# Patient Record
Sex: FEMALE | Race: WHITE | NOT HISPANIC OR LATINO | Employment: OTHER | URBAN - METROPOLITAN AREA
[De-identification: names, ages, dates, MRNs, and addresses within clinical notes are randomized per-mention and may not be internally consistent; named-entity substitution may affect disease eponyms.]

---

## 2017-01-31 ENCOUNTER — APPOINTMENT (OUTPATIENT)
Dept: GENERAL RADIOLOGY | Facility: HOSPITAL | Age: 62
End: 2017-01-31

## 2017-01-31 ENCOUNTER — HOSPITAL ENCOUNTER (EMERGENCY)
Facility: HOSPITAL | Age: 62
Discharge: HOME | End: 2017-01-31
Attending: EMERGENCY MEDICINE | Admitting: EMERGENCY MEDICINE

## 2017-01-31 VITALS
RESPIRATION RATE: 16 BRPM | OXYGEN SATURATION: 96 % | BODY MASS INDEX: 23.92 KG/M2 | WEIGHT: 130 LBS | HEART RATE: 96 BPM | DIASTOLIC BLOOD PRESSURE: 79 MMHG | TEMPERATURE: 98.1 F | SYSTOLIC BLOOD PRESSURE: 121 MMHG | HEIGHT: 62 IN

## 2017-01-31 DIAGNOSIS — E87.6 HYPOKALEMIA: ICD-10-CM

## 2017-01-31 DIAGNOSIS — I47.1 SVT (SUPRAVENTRICULAR TACHYCARDIA) (HCC): Primary | ICD-10-CM

## 2017-01-31 LAB
ALBUMIN SERPL-MCNC: 4.9 G/DL (ref 3.2–4.8)
ALBUMIN/GLOB SERPL: 1.4 G/DL (ref 1.5–2.5)
ALP SERPL-CCNC: 88 U/L (ref 25–100)
ALT SERPL W P-5'-P-CCNC: 18 U/L (ref 7–40)
ANION GAP SERPL CALCULATED.3IONS-SCNC: 11 MMOL/L (ref 3–11)
AST SERPL-CCNC: 29 U/L (ref 0–33)
BASOPHILS # BLD AUTO: 0.02 10*3/MM3 (ref 0–0.2)
BASOPHILS NFR BLD AUTO: 0.2 % (ref 0–1)
BILIRUB SERPL-MCNC: 0.5 MG/DL (ref 0.3–1.2)
BUN BLD-MCNC: 11 MG/DL (ref 9–23)
BUN/CREAT SERPL: 13.8 (ref 7–25)
CALCIUM SPEC-SCNC: 10.3 MG/DL (ref 8.7–10.4)
CHLORIDE SERPL-SCNC: 100 MMOL/L (ref 99–109)
CO2 SERPL-SCNC: 27 MMOL/L (ref 20–31)
CREAT BLD-MCNC: 0.8 MG/DL (ref 0.6–1.3)
DEPRECATED RDW RBC AUTO: 43.9 FL (ref 37–54)
EOSINOPHIL # BLD AUTO: 0.06 10*3/MM3 (ref 0.1–0.3)
EOSINOPHIL NFR BLD AUTO: 0.7 % (ref 0–3)
ERYTHROCYTE [DISTWIDTH] IN BLOOD BY AUTOMATED COUNT: 13.2 % (ref 11.3–14.5)
GFR SERPL CREATININE-BSD FRML MDRD: 73 ML/MIN/1.73
GLOBULIN UR ELPH-MCNC: 3.6 GM/DL
GLUCOSE BLD-MCNC: 146 MG/DL (ref 70–100)
HCT VFR BLD AUTO: 45 % (ref 34.5–44)
HGB BLD-MCNC: 15.2 G/DL (ref 11.5–15.5)
HOLD SPECIMEN: NORMAL
HOLD SPECIMEN: NORMAL
IMM GRANULOCYTES # BLD: 0.02 10*3/MM3 (ref 0–0.03)
IMM GRANULOCYTES NFR BLD: 0.2 % (ref 0–0.6)
INR PPP: 1.2 (ref 0.8–1.2)
LYMPHOCYTES # BLD AUTO: 3.41 10*3/MM3 (ref 0.6–4.8)
LYMPHOCYTES NFR BLD AUTO: 40.6 % (ref 24–44)
MAGNESIUM SERPL-MCNC: 2.2 MG/DL (ref 1.3–2.7)
MCH RBC QN AUTO: 31 PG (ref 27–31)
MCHC RBC AUTO-ENTMCNC: 33.8 G/DL (ref 32–36)
MCV RBC AUTO: 91.6 FL (ref 80–99)
MONOCYTES # BLD AUTO: 0.67 10*3/MM3 (ref 0–1)
MONOCYTES NFR BLD AUTO: 8 % (ref 0–12)
NEUTROPHILS # BLD AUTO: 4.22 10*3/MM3 (ref 1.5–8.3)
NEUTROPHILS NFR BLD AUTO: 50.3 % (ref 41–71)
PLATELET # BLD AUTO: 356 10*3/MM3 (ref 150–450)
PMV BLD AUTO: 9.8 FL (ref 6–12)
POTASSIUM BLD-SCNC: 3.3 MMOL/L (ref 3.5–5.5)
PROT SERPL-MCNC: 8.5 G/DL (ref 5.7–8.2)
PROTHROMBIN TIME: 13.8 SECONDS (ref 12.8–15.2)
RBC # BLD AUTO: 4.91 10*6/MM3 (ref 3.89–5.14)
SODIUM BLD-SCNC: 138 MMOL/L (ref 132–146)
T4 SERPL-MCNC: 9.1 MCG/DL (ref 4.7–11.4)
TROPONIN I SERPL-MCNC: <0.006 NG/ML
TSH SERPL DL<=0.05 MIU/L-ACNC: 2.01 MIU/ML (ref 0.35–5.35)
WBC NRBC COR # BLD: 8.4 10*3/MM3 (ref 3.5–10.8)
WHOLE BLOOD HOLD SPECIMEN: NORMAL
WHOLE BLOOD HOLD SPECIMEN: NORMAL

## 2017-01-31 PROCEDURE — 84443 ASSAY THYROID STIM HORMONE: CPT | Performed by: EMERGENCY MEDICINE

## 2017-01-31 PROCEDURE — 84436 ASSAY OF TOTAL THYROXINE: CPT | Performed by: EMERGENCY MEDICINE

## 2017-01-31 PROCEDURE — 83735 ASSAY OF MAGNESIUM: CPT | Performed by: EMERGENCY MEDICINE

## 2017-01-31 PROCEDURE — 99284 EMERGENCY DEPT VISIT MOD MDM: CPT

## 2017-01-31 PROCEDURE — 80053 COMPREHEN METABOLIC PANEL: CPT | Performed by: EMERGENCY MEDICINE

## 2017-01-31 PROCEDURE — 84484 ASSAY OF TROPONIN QUANT: CPT | Performed by: EMERGENCY MEDICINE

## 2017-01-31 PROCEDURE — 85610 PROTHROMBIN TIME: CPT

## 2017-01-31 PROCEDURE — 25010000002 ADENOSINE PER 6 MG: Performed by: EMERGENCY MEDICINE

## 2017-01-31 PROCEDURE — 96374 THER/PROPH/DIAG INJ IV PUSH: CPT

## 2017-01-31 PROCEDURE — 93005 ELECTROCARDIOGRAM TRACING: CPT | Performed by: EMERGENCY MEDICINE

## 2017-01-31 PROCEDURE — 85014 HEMATOCRIT: CPT

## 2017-01-31 PROCEDURE — 85025 COMPLETE CBC W/AUTO DIFF WBC: CPT | Performed by: EMERGENCY MEDICINE

## 2017-01-31 PROCEDURE — 80047 BASIC METABLC PNL IONIZED CA: CPT

## 2017-01-31 PROCEDURE — 71010 HC CHEST PA OR AP: CPT

## 2017-01-31 RX ORDER — DILTIAZEM HYDROCHLORIDE 180 MG/1
180 CAPSULE, COATED, EXTENDED RELEASE ORAL DAILY
Qty: 30 CAPSULE | Refills: 0 | Status: SHIPPED | OUTPATIENT
Start: 2017-01-31 | End: 2017-06-02 | Stop reason: HOSPADM

## 2017-01-31 RX ORDER — DILTIAZEM HCL/D5W 125 MG/125
5-15 PLASTIC BAG, INJECTION (ML) INTRAVENOUS
Status: DISCONTINUED | OUTPATIENT
Start: 2017-01-31 | End: 2017-01-31

## 2017-01-31 RX ORDER — SODIUM CHLORIDE 0.9 % (FLUSH) 0.9 %
10 SYRINGE (ML) INJECTION AS NEEDED
Status: DISCONTINUED | OUTPATIENT
Start: 2017-01-31 | End: 2017-01-31 | Stop reason: HOSPADM

## 2017-01-31 RX ORDER — ADENOSINE 3 MG/ML
6 INJECTION, SOLUTION INTRAVENOUS ONCE
Status: COMPLETED | OUTPATIENT
Start: 2017-01-31 | End: 2017-01-31

## 2017-01-31 RX ORDER — POTASSIUM CHLORIDE 750 MG/1
20 CAPSULE, EXTENDED RELEASE ORAL ONCE
Status: COMPLETED | OUTPATIENT
Start: 2017-01-31 | End: 2017-01-31

## 2017-01-31 RX ORDER — LEVOTHYROXINE SODIUM 0.05 MG/1
50 TABLET ORAL DAILY
COMMUNITY
End: 2018-09-20 | Stop reason: SDUPTHER

## 2017-01-31 RX ORDER — ADENOSINE 3 MG/ML
INJECTION, SOLUTION INTRAVENOUS
Status: DISCONTINUED
Start: 2017-01-31 | End: 2017-01-31 | Stop reason: HOSPADM

## 2017-01-31 RX ORDER — POTASSIUM CHLORIDE 750 MG/1
10 TABLET, FILM COATED, EXTENDED RELEASE ORAL DAILY
Qty: 4 TABLET | Refills: 0 | Status: SHIPPED | OUTPATIENT
Start: 2017-01-31 | End: 2017-03-29

## 2017-01-31 RX ADMIN — POTASSIUM CHLORIDE 20 MEQ: 750 CAPSULE, EXTENDED RELEASE ORAL at 16:45

## 2017-01-31 RX ADMIN — ADENOSINE 6 MG: 3 INJECTION, SOLUTION INTRAVENOUS at 14:25

## 2017-02-03 LAB
BUN BLDA-MCNC: 12 MG/DL (ref 8–26)
CA-I BLDA-SCNC: 1.11 MMOL/L (ref 1.2–1.32)
CHLORIDE BLDA-SCNC: 102 MMOL/L (ref 98–109)
CO2 BLDA-SCNC: 23 MMOL/L (ref 24–29)
CREAT BLDA-MCNC: 0.8 MG/DL (ref 0.6–1.3)
GLUCOSE BLDC GLUCOMTR-MCNC: 147 MG/DL (ref 70–130)
HCT VFR BLDA CALC: 46 % (ref 38–51)
HGB BLDA-MCNC: 15.6 G/DL (ref 12–17)
POTASSIUM BLDA-SCNC: 3.5 MMOL/L (ref 3.5–4.9)
SODIUM BLDA-SCNC: 141 MMOL/L (ref 138–146)

## 2017-02-09 ENCOUNTER — TRANSCRIBE ORDERS (OUTPATIENT)
Dept: LAB | Facility: HOSPITAL | Age: 62
End: 2017-02-09

## 2017-02-09 ENCOUNTER — LAB (OUTPATIENT)
Dept: LAB | Facility: HOSPITAL | Age: 62
End: 2017-02-09

## 2017-02-09 DIAGNOSIS — E87.6 HYPOKALEMIA: ICD-10-CM

## 2017-02-09 DIAGNOSIS — E87.6 HYPOKALEMIA: Primary | ICD-10-CM

## 2017-02-09 LAB
ANION GAP SERPL CALCULATED.3IONS-SCNC: 8 MMOL/L (ref 3–11)
BUN BLD-MCNC: 10 MG/DL (ref 9–23)
BUN/CREAT SERPL: 12.5 (ref 7–25)
CALCIUM SPEC-SCNC: 9.9 MG/DL (ref 8.7–10.4)
CHLORIDE SERPL-SCNC: 107 MMOL/L (ref 99–109)
CO2 SERPL-SCNC: 26 MMOL/L (ref 20–31)
CREAT BLD-MCNC: 0.8 MG/DL (ref 0.6–1.3)
GFR SERPL CREATININE-BSD FRML MDRD: 73 ML/MIN/1.73
GLUCOSE BLD-MCNC: 117 MG/DL (ref 70–100)
MAGNESIUM SERPL-MCNC: 2.1 MG/DL (ref 1.3–2.7)
POTASSIUM BLD-SCNC: 4.3 MMOL/L (ref 3.5–5.5)
SODIUM BLD-SCNC: 141 MMOL/L (ref 132–146)

## 2017-02-09 PROCEDURE — 36415 COLL VENOUS BLD VENIPUNCTURE: CPT | Performed by: INTERNAL MEDICINE

## 2017-02-09 PROCEDURE — 80048 BASIC METABOLIC PNL TOTAL CA: CPT | Performed by: INTERNAL MEDICINE

## 2017-02-09 PROCEDURE — 83735 ASSAY OF MAGNESIUM: CPT | Performed by: INTERNAL MEDICINE

## 2017-03-17 PROBLEM — M47.816 LUMBAR FACET ARTHROPATHY: Status: ACTIVE | Noted: 2017-03-17

## 2017-03-17 PROBLEM — M96.1 POSTLAMINECTOMY SYNDROME, LUMBAR: Status: ACTIVE | Noted: 2017-03-17

## 2017-03-17 PROBLEM — M43.17 SPONDYLOLISTHESIS OF LUMBOSACRAL REGION: Status: ACTIVE | Noted: 2017-03-17

## 2017-03-17 PROBLEM — M70.61 GREATER TROCHANTERIC BURSITIS OF RIGHT HIP: Status: ACTIVE | Noted: 2017-03-17

## 2017-03-17 PROBLEM — M79.18 MYOFACIAL MUSCLE PAIN: Status: ACTIVE | Noted: 2017-03-17

## 2017-03-17 PROBLEM — M53.3 SACROILIAC JOINT DYSFUNCTION OF RIGHT SIDE: Status: ACTIVE | Noted: 2017-03-17

## 2017-03-17 PROBLEM — M48.061 NEUROFORAMINAL STENOSIS OF LUMBAR SPINE: Status: ACTIVE | Noted: 2017-03-17

## 2017-03-17 PROBLEM — M21.70 UNEQUAL LEG LENGTH: Status: ACTIVE | Noted: 2017-03-17

## 2017-03-29 ENCOUNTER — CONSULT (OUTPATIENT)
Dept: CARDIOLOGY | Facility: CLINIC | Age: 62
End: 2017-03-29

## 2017-03-29 VITALS
DIASTOLIC BLOOD PRESSURE: 62 MMHG | HEIGHT: 64 IN | WEIGHT: 134.2 LBS | BODY MASS INDEX: 22.91 KG/M2 | SYSTOLIC BLOOD PRESSURE: 104 MMHG | HEART RATE: 62 BPM

## 2017-03-29 DIAGNOSIS — I47.1 SVT (SUPRAVENTRICULAR TACHYCARDIA) (HCC): Primary | ICD-10-CM

## 2017-03-29 PROBLEM — I47.10 SVT (SUPRAVENTRICULAR TACHYCARDIA): Status: ACTIVE | Noted: 2017-03-29

## 2017-03-29 PROCEDURE — 99244 OFF/OP CNSLTJ NEW/EST MOD 40: CPT | Performed by: INTERNAL MEDICINE

## 2017-03-29 PROCEDURE — 93000 ELECTROCARDIOGRAM COMPLETE: CPT | Performed by: INTERNAL MEDICINE

## 2017-03-29 RX ORDER — GUAIFENESIN 600 MG/1
1200 TABLET, EXTENDED RELEASE ORAL 2 TIMES DAILY
COMMUNITY
End: 2017-05-31 | Stop reason: DRUGHIGH

## 2017-03-29 NOTE — PROGRESS NOTES
Cardiology Consult     Zainab Kuhn  1955  [phone]  [phone]    03/29/17      [facility]    Mayra Stapleton MD  [address] [facility] [address]    Chief Complaint   Patient presents with   • Palpitations       1)SVT    A) Echo 3/8/2016: Normal LV wall motion and contractility. No valvular heart disease. There is an E to A reversal suggestive of diastolic dysfunction.    B) Documentation during 1/31/2017 ER visit - terminated with Adenosine.    2) Surgical History   Back surgery, parathyroid surgery, Tubal ligation, Sinus surgery.     History of Present Illness:   Ms. Kuhn is a pleasant 61-year-old  female who presented to the [facility] emergency department January 21, 2017 with complaints of tachypalpitations she was determined to be in supraventricular tachycardia.  She is an established patient of Dr. Mcdonald. Her first episode of SVT was March 2016. She was not started on any AVN agents. Then in November 2016, she started having more frequent episodes.  These episodes would last a few seconds. With this last episode, she tried vagal maneuvers and after an 1 hour, she went to the ER and she was given adenosine and converted to normal rhythm. Her symptoms are rapid tachy palpitations, SOB, presyncope, and chest discomfort. No syncope. Since then, she still has experienced some palpitations and feels like she is about to have an episode, but it goes away. When she is in NSR, she does not have SOB or palpitations. She states that she may have had a fast heart rate as a child. She has history of PVC's.  Laboratory evaluation the setting of that emergency department visit showed her potassium to be low at 3.3.  Magnesium  was normal at 2.2. Creatinine normal at 0.8. She also had a normal TSH. She has quit caffeine since January. She denies excessive ETOH, she drinks 4 oz of wine daily. No stimulants like Sudafed. No energy  drinks.     Allergies   Allergen Reactions   • Clobetasol Propionate    • Nasonex [Mometasone Furoate]    • Other      steroids   • Prednisone         Cannot display prior to admission medications because the patient has not been admitted in this contact.            Current Outpatient Prescriptions:   •  diltiaZEM CD (CARDIZEM CD) 180 MG 24 hr capsule, Take 1 capsule by mouth Daily., Disp: 30 capsule, Rfl: 0  •  guaiFENesin (MUCINEX) 600 MG 12 hr tablet, Take 1,200 mg by mouth 2 (Two) Times a Day., Disp: , Rfl:   •  levothyroxine (LEVOXYL) 50 MCG tablet, Take 50 mcg by mouth Daily., Disp: , Rfl:     Social History     Social History   • Marital status:      Spouse name: N/A   • Number of children: N/A   • Years of education: N/A     Social History Main Topics   • Smoking status: Never Smoker   • Smokeless tobacco: None   • Alcohol use No   • Drug use: No   • Sexual activity: Not Asked     Other Topics Concern   • None     Social History Narrative       Family History   Problem Relation Age of Onset   • Hypertension Mother    • Hypotension Father    • Prostate cancer Father    Father has CAD. No arrythmias.     REVIEW OF SYSTEMS:   CONST:  No weight loss, fever, chills, weakness or fatigue.   HEENT:  No visual loss, blurred vision, double vision, yellow sclerae.                   No hearing loss, congestion, sore throat.   SKIN:      No rashes, urticaria, ulcers, sores.     RESP:     Positive shortness of breath, No  hemoptysis, cough, sputum.   GI:           No anorexia, nausea, vomiting, diarrhea. No abdominal pain, melena.   :         No burning on urination, hematuria or increased frequency.  ENDO:    No diaphoresis, cold or heat intolerance. No polyuria or polydipsia.   NEURO:  No headache, dizziness, syncope, paralysis, ataxia, or parasthesias.                  No change in bowel or bladder control. No history of CVA/TIA  MUSC:    No muscle, back pain, joint pain or stiffness.   HEME:    No anemia,  "bleeding, bruising. No history of DVT/PE.  PSYCH:  No history of depression, anxiety    Vitals:    03/29/17 0910   BP: 104/62   BP Location: Right arm   Patient Position: Sitting   Pulse: 62   Weight: 134 lb 3.2 oz (60.9 kg)   Height: 64\" (162.6 cm)       Physical Exam:  GEN: Well nourished, Well- developed  No acute distress  HEENT: Normocephalic, Atraumatic, PERRLA, moist mucous membranes  NECK: supple, NO JVD, no thyromegaly, no lymphadenopathy  CARD: S1S2  RRR no murmur, gallop, rub  LUNGS: Clear to auscultataion, normal respiratory effort  ABDOMEN: Soft, nontender, normal bowel sounds  EXTREMITIES:No gross deformities,  No clubbing, cyanosis, or edema  SKIN: Warm, dry, intact, no rashes  NEURO: No focal deficits  PSYCHIATRIC: Normal affect and mood, A and O x 3      Data:                                      ECG 12 Lead  Date/Time: 3/29/2017 9:15 AM  Performed by: SARAH OLIVER  Authorized by: SARAH OLIVER   Rhythm: sinus rhythm  BPM: 62                  Assessment and Plan:   1. SVT (supraventricular tachycardia)    Dr. Oliver had a long discussion with the patient about the nature of her arrhythmia and options for treating it. 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 The risks, benefits, and alternatives of the procedure have been reviewed and the patient wishes to proceed. She would need to stop her Diltiazem 5 days before the procedure.    Scribed for Sarah Oliver MD by Fabby Vega PA-C. 3/29/2017  9:48 AM    ISarah MD, personally performed the services face to face as described in this documentation and as scribed by the above named individual in my presence, and it is both accurate and complete.  3/29/2017  9:52 AM                     "

## 2017-04-20 ENCOUNTER — TRANSCRIBE ORDERS (OUTPATIENT)
Dept: ADMINISTRATIVE | Facility: HOSPITAL | Age: 62
End: 2017-04-20

## 2017-04-20 DIAGNOSIS — Z12.31 VISIT FOR SCREENING MAMMOGRAM: Primary | ICD-10-CM

## 2017-05-04 ENCOUNTER — HOSPITAL ENCOUNTER (OUTPATIENT)
Dept: MAMMOGRAPHY | Facility: HOSPITAL | Age: 62
Discharge: HOME | End: 2017-05-04
Admitting: INTERNAL MEDICINE

## 2017-05-04 DIAGNOSIS — Z12.31 VISIT FOR SCREENING MAMMOGRAM: ICD-10-CM

## 2017-05-04 PROCEDURE — 77063 BREAST TOMOSYNTHESIS BI: CPT | Performed by: RADIOLOGY

## 2017-05-04 PROCEDURE — G0202 SCR MAMMO BI INCL CAD: HCPCS

## 2017-05-04 PROCEDURE — 77063 BREAST TOMOSYNTHESIS BI: CPT

## 2017-05-04 PROCEDURE — 77067 SCR MAMMO BI INCL CAD: CPT | Performed by: RADIOLOGY

## 2017-05-05 ENCOUNTER — PREP FOR SURGERY (OUTPATIENT)
Dept: CARDIOLOGY | Facility: CLINIC | Age: 62
End: 2017-05-05

## 2017-05-05 DIAGNOSIS — I47.1 SVT (SUPRAVENTRICULAR TACHYCARDIA) (HCC): Primary | ICD-10-CM

## 2017-05-05 RX ORDER — PROMETHAZINE HYDROCHLORIDE 25 MG/ML
12.5 INJECTION, SOLUTION INTRAMUSCULAR; INTRAVENOUS EVERY 4 HOURS PRN
Status: CANCELLED | OUTPATIENT
Start: 2017-05-05

## 2017-05-05 RX ORDER — ACETAMINOPHEN 325 MG/1
650 TABLET ORAL EVERY 4 HOURS PRN
Status: CANCELLED | OUTPATIENT
Start: 2017-05-05

## 2017-05-05 RX ORDER — NITROGLYCERIN 0.4 MG/1
0.4 TABLET SUBLINGUAL
Status: CANCELLED | OUTPATIENT
Start: 2017-05-05

## 2017-05-31 ENCOUNTER — APPOINTMENT (OUTPATIENT)
Dept: PREADMISSION TESTING | Facility: HOSPITAL | Age: 62
End: 2017-05-31

## 2017-05-31 DIAGNOSIS — I47.1 SVT (SUPRAVENTRICULAR TACHYCARDIA) (HCC): ICD-10-CM

## 2017-05-31 LAB
ANION GAP SERPL CALCULATED.3IONS-SCNC: 4 MMOL/L (ref 3–11)
BUN BLD-MCNC: 10 MG/DL (ref 9–23)
BUN/CREAT SERPL: 14.3 (ref 7–25)
CALCIUM SPEC-SCNC: 9.9 MG/DL (ref 8.7–10.4)
CHLORIDE SERPL-SCNC: 105 MMOL/L (ref 99–109)
CO2 SERPL-SCNC: 32 MMOL/L (ref 20–31)
CREAT BLD-MCNC: 0.7 MG/DL (ref 0.6–1.3)
DEPRECATED RDW RBC AUTO: 45.1 FL (ref 37–54)
ERYTHROCYTE [DISTWIDTH] IN BLOOD BY AUTOMATED COUNT: 13.1 % (ref 11.3–14.5)
GFR SERPL CREATININE-BSD FRML MDRD: 85 ML/MIN/1.73
GLUCOSE BLD-MCNC: 78 MG/DL (ref 70–100)
HBA1C MFR BLD: 5.1 % (ref 4.8–5.6)
HCT VFR BLD AUTO: 43.2 % (ref 34.5–44)
HGB BLD-MCNC: 14.2 G/DL (ref 11.5–15.5)
INR PPP: 0.92
MCH RBC QN AUTO: 30.7 PG (ref 27–31)
MCHC RBC AUTO-ENTMCNC: 32.9 G/DL (ref 32–36)
MCV RBC AUTO: 93.5 FL (ref 80–99)
PLATELET # BLD AUTO: 279 10*3/MM3 (ref 150–450)
PMV BLD AUTO: 9.3 FL (ref 6–12)
POTASSIUM BLD-SCNC: 4.4 MMOL/L (ref 3.5–5.5)
PROTHROMBIN TIME: 10 SECONDS (ref 9.6–11.5)
RBC # BLD AUTO: 4.62 10*6/MM3 (ref 3.89–5.14)
SODIUM BLD-SCNC: 141 MMOL/L (ref 132–146)
WBC NRBC COR # BLD: 4.8 10*3/MM3 (ref 3.5–10.8)

## 2017-05-31 RX ORDER — LEVOTHYROXINE SODIUM 0.05 MG/1
50 TABLET ORAL DAILY
COMMUNITY
Start: 2015-06-18 | End: 2017-05-31 | Stop reason: SDUPTHER

## 2017-05-31 RX ORDER — IBUPROFEN 200 MG
400 TABLET ORAL EVERY 6 HOURS PRN
COMMUNITY
Start: 2015-06-18 | End: 2022-04-20

## 2017-05-31 RX ORDER — TIZANIDINE 2 MG/1
1 TABLET ORAL 3 TIMES DAILY PRN
COMMUNITY
Start: 2013-08-29 | End: 2020-01-08 | Stop reason: SDUPTHER

## 2017-06-01 ENCOUNTER — HOSPITAL ENCOUNTER (OUTPATIENT)
Facility: HOSPITAL | Age: 62
Setting detail: OBSERVATION
Discharge: HOME | End: 2017-06-02
Attending: INTERNAL MEDICINE | Admitting: INTERNAL MEDICINE

## 2017-06-01 DIAGNOSIS — I47.1 SVT (SUPRAVENTRICULAR TACHYCARDIA) (HCC): ICD-10-CM

## 2017-06-01 PROBLEM — I49.3 PVC (PREMATURE VENTRICULAR CONTRACTION): Status: ACTIVE | Noted: 2017-06-01

## 2017-06-01 PROCEDURE — 93613 INTRACARDIAC EPHYS 3D MAPG: CPT | Performed by: INTERNAL MEDICINE

## 2017-06-01 PROCEDURE — C1732 CATH, EP, DIAG/ABL, 3D/VECT: HCPCS | Performed by: INTERNAL MEDICINE

## 2017-06-01 PROCEDURE — G0378 HOSPITAL OBSERVATION PER HR: HCPCS

## 2017-06-01 PROCEDURE — 25010000002 ONDANSETRON PER 1 MG: Performed by: INTERNAL MEDICINE

## 2017-06-01 PROCEDURE — 93653 COMPRE EP EVAL TX SVT: CPT | Performed by: INTERNAL MEDICINE

## 2017-06-01 PROCEDURE — 25010000002 FENTANYL CITRATE (PF) 100 MCG/2ML SOLUTION: Performed by: INTERNAL MEDICINE

## 2017-06-01 PROCEDURE — 25010000002 MIDAZOLAM PER 1 MG: Performed by: INTERNAL MEDICINE

## 2017-06-01 PROCEDURE — 93623 PRGRMD STIMJ&PACG IV RX NFS: CPT | Performed by: INTERNAL MEDICINE

## 2017-06-01 PROCEDURE — C1730 CATH, EP, 19 OR FEW ELECT: HCPCS | Performed by: INTERNAL MEDICINE

## 2017-06-01 PROCEDURE — C1894 INTRO/SHEATH, NON-LASER: HCPCS | Performed by: INTERNAL MEDICINE

## 2017-06-01 PROCEDURE — 25010000002 HEPARIN (PORCINE) PER 1000 UNITS: Performed by: INTERNAL MEDICINE

## 2017-06-01 RX ORDER — MIDAZOLAM HYDROCHLORIDE 1 MG/ML
INJECTION INTRAMUSCULAR; INTRAVENOUS AS NEEDED
Status: DISCONTINUED | OUTPATIENT
Start: 2017-06-01 | End: 2017-06-01 | Stop reason: HOSPADM

## 2017-06-01 RX ORDER — FENTANYL CITRATE 50 UG/ML
INJECTION, SOLUTION INTRAMUSCULAR; INTRAVENOUS AS NEEDED
Status: DISCONTINUED | OUTPATIENT
Start: 2017-06-01 | End: 2017-06-01 | Stop reason: HOSPADM

## 2017-06-01 RX ORDER — LIDOCAINE HYDROCHLORIDE 10 MG/ML
INJECTION, SOLUTION INFILTRATION; PERINEURAL AS NEEDED
Status: DISCONTINUED | OUTPATIENT
Start: 2017-06-01 | End: 2017-06-01 | Stop reason: HOSPADM

## 2017-06-01 RX ORDER — SODIUM CHLORIDE 9 MG/ML
INJECTION, SOLUTION INTRAVENOUS CONTINUOUS PRN
Status: DISCONTINUED | OUTPATIENT
Start: 2017-06-01 | End: 2017-06-01 | Stop reason: HOSPADM

## 2017-06-01 RX ORDER — PROMETHAZINE HYDROCHLORIDE 25 MG/ML
12.5 INJECTION, SOLUTION INTRAMUSCULAR; INTRAVENOUS EVERY 4 HOURS PRN
Status: DISCONTINUED | OUTPATIENT
Start: 2017-06-01 | End: 2017-06-01 | Stop reason: HOSPADM

## 2017-06-01 RX ORDER — HYDROCODONE BITARTRATE AND ACETAMINOPHEN 5; 325 MG/1; MG/1
1 TABLET ORAL EVERY 4 HOURS PRN
Status: DISCONTINUED | OUTPATIENT
Start: 2017-06-01 | End: 2017-06-02 | Stop reason: HOSPADM

## 2017-06-01 RX ORDER — TIZANIDINE 4 MG/1
2 TABLET ORAL 3 TIMES DAILY PRN
Status: DISCONTINUED | OUTPATIENT
Start: 2017-06-01 | End: 2017-06-02 | Stop reason: HOSPADM

## 2017-06-01 RX ORDER — ACETAMINOPHEN 325 MG/1
650 TABLET ORAL EVERY 4 HOURS PRN
Status: DISCONTINUED | OUTPATIENT
Start: 2017-06-01 | End: 2017-06-01 | Stop reason: HOSPADM

## 2017-06-01 RX ORDER — NITROGLYCERIN 0.4 MG/1
0.4 TABLET SUBLINGUAL
Status: DISCONTINUED | OUTPATIENT
Start: 2017-06-01 | End: 2017-06-01 | Stop reason: HOSPADM

## 2017-06-01 RX ORDER — LEVOTHYROXINE SODIUM 0.05 MG/1
50 TABLET ORAL
Status: DISCONTINUED | OUTPATIENT
Start: 2017-06-02 | End: 2017-06-02 | Stop reason: HOSPADM

## 2017-06-01 RX ORDER — ONDANSETRON 2 MG/ML
4 INJECTION INTRAMUSCULAR; INTRAVENOUS EVERY 6 HOURS PRN
Status: DISCONTINUED | OUTPATIENT
Start: 2017-06-01 | End: 2017-06-02 | Stop reason: HOSPADM

## 2017-06-01 RX ORDER — ONDANSETRON 2 MG/ML
INJECTION INTRAMUSCULAR; INTRAVENOUS AS NEEDED
Status: DISCONTINUED | OUTPATIENT
Start: 2017-06-01 | End: 2017-06-01 | Stop reason: HOSPADM

## 2017-06-01 RX ORDER — ACETAMINOPHEN 325 MG/1
650 TABLET ORAL EVERY 4 HOURS PRN
Status: DISCONTINUED | OUTPATIENT
Start: 2017-06-01 | End: 2017-06-02 | Stop reason: HOSPADM

## 2017-06-01 NOTE — PLAN OF CARE
Problem: Patient Care Overview (Adult)  Goal: Plan of Care Review  Outcome: Ongoing (interventions implemented as appropriate)    06/01/17 0840   Coping/Psychosocial Response Interventions   Plan Of Care Reviewed With patient   Patient Care Overview   Progress no change   Outcome Evaluation   Outcome Summary/Follow up Plan PT TO HAVE EP STUDY WITH DR. LI TODAY.         Problem: Arrhythmia/Dysrhythmia (Symptomatic) (Adult)  Goal: Signs and Symptoms of Listed Potential Problems Will be Absent or Manageable (Arrhythmia/Dysrhythmia)    06/01/17 0840   Arrhythmia/Dysrhythmia (Symptomatic)   Problems Assessed (Arrhythmia/Dysrhythmia) all   Problems Present (Arrhythmia/Dysrhythmia) none

## 2017-06-01 NOTE — H&P
HISTORY AND PHYSICAL                                          CC:SVT    Patient Care Team:  Mayra Stapleton MD as PCP - General  Mayra Stapleton MD as PCP - Family Medicine   Referring Provider:Dr. Aaron Foster    PROBLEM LIST:  1)SVT    A) Echo 3/8/2016: Normal LV wall motion and contractility. No valvular heart disease. There is an E to A reversal suggestive of diastolic dysfunction.    B) Documentation during 1/31/2017 ER visit - terminated with Adenosine.    2) Surgical History   Back surgery, parathyroid surgery, Tubal ligation, Sinus surgery.    Patient Active Problem List   Diagnosis   • Postlaminectomy syndrome, lumbar   • Spondylolisthesis of lumbosacral region   • Unequal leg length   • Sacroiliac joint dysfunction of right side   • Greater trochanteric bursitis of right hip   • Myofacial muscle pain   • Lumbar facet arthropathy   • Neuroforaminal stenosis of lumbar spine   • SVT (supraventricular tachycardia)   • PVC (premature ventricular contraction)       Active Ambulatory Problems     Diagnosis Date Noted   • Postlaminectomy syndrome, lumbar 03/17/2017   • Spondylolisthesis of lumbosacral region 03/17/2017   • Unequal leg length 03/17/2017   • Sacroiliac joint dysfunction of right side 03/17/2017   • Greater trochanteric bursitis of right hip 03/17/2017   • Myofacial muscle pain 03/17/2017   • Lumbar facet arthropathy 03/17/2017   • Neuroforaminal stenosis of lumbar spine 03/17/2017   • SVT (supraventricular tachycardia) 03/29/2017     Resolved Ambulatory Problems     Diagnosis Date Noted   • No Resolved Ambulatory Problems     Past Medical History:   Diagnosis Date   • Back pain    • Constipation    • Disease of thyroid gland    • Limb pain    • Muscle spasm    • Pain, upper back    • PONV (postoperative nausea and vomiting)    • Skin cancer    • SVT (supraventricular tachycardia)    • Wears glasses   "      Prescriptions Prior to Admission   Medication Sig Dispense Refill Last Dose   • Fexofenadine HCl (MUCINEX ALLERGY PO) Take 1,200 mg by mouth Daily.   5/31/2017 at Unknown time   • ibuprofen (ADVIL) 200 MG tablet Take 400 mg by mouth Every 6 (Six) Hours As Needed.   Past Week at Unknown time   • levothyroxine (LEVOXYL) 50 MCG tablet Take 50 mcg by mouth Daily.   6/1/2017 at Unknown time   • Multiple Vitamins-Minerals (MULTIVITAMIN ADULT PO) Take 1 tablet by mouth Daily.   5/31/2017 at Unknown time   • tiZANidine (ZANAFLEX) 2 MG tablet Take 1 tablet by mouth 3 (Three) Times a Day As Needed.   Past Month at Unknown time   • diltiaZEM CD (CARDIZEM CD) 180 MG 24 hr capsule Take 1 capsule by mouth Daily. (Patient taking differently: Take 180 mg by mouth Daily. INSTRUCTED TO STOP PER DR LI ORDERS PER PT REPORT) 30 capsule 0 5/26/2017       Subjective .     History of present illness:   The patient is a pleasant 62-year-old white female who is admitted to [facility] today for EP study and RFA of adenosine sensitive SVT.  Patient reports that she has had multiple episodes of tachypalpitations some dating back to when she was in adolescence.  Recently these have occurred more often and are lasting longer duration.  She was admitted to [facility] the emergency room on 01/21/2017 with tachypalpitations and EKG revealed an SVT.  She received IV adenosine which abated the arrhythmia.  She initially was evaluated by Dr. Aaron Foster prior to this in early November and another episode previous to that in March 2016.  She does have occasional \"skipped beats\" and palpitations with a known history of PVCs.  She denies any chest tightness or chest pain suggesting as pectoris.  She denies any near-syncope or significant events.  She had normal TSH level when she was in the ER in January.  She however, she did have low potassium of 3.3 on this most recent ER visit.  She eliminate caffeine from her " "diet and denies excessive alcohol use.      Social History     Social History   • Marital status:      Spouse name: N/A   • Number of children: N/A   • Years of education: N/A     Occupational History   • Not on file.     Social History Main Topics   • Smoking status: Never Smoker   • Smokeless tobacco: Never Used   • Alcohol use 4.2 oz/week     7 Glasses of wine per week      Comment: 4OZ DAILY    • Drug use: No   • Sexual activity: Defer     Other Topics Concern   • Not on file     Social History Narrative     Family History   Problem Relation Age of Onset   • Hypertension Mother    • Hypotension Father    • Prostate cancer Father    • Breast cancer Neg Hx    • Endometrial cancer Neg Hx    • Ovarian cancer Neg Hx      Past Surgical History:   Procedure Laterality Date   • BACK SURGERY  2009   • COLONOSCOPY  2012   • LUMBAR DISC SURGERY  2009    Status post left L3-L4 discectomy. Minor recurrent disc bulge at L3-L4. Possible discogenic pain component; PER DR MACK    • PARATHYROIDECTOMY  01/2015    Parathyroid Complete Parathyroidectomy   • SINUS SURGERY     • SKIN CANCER EXCISION Right     MELANOMA REMOVED FROM RIGHT THIGH    • THYROID SURGERY  01/2015   • TUBAL ABDOMINAL LIGATION         Review of Systems:   Pertinent items are noted in HPI, all other systems reviewed and negative    Objective     Vitals:  Blood pressure 98/64, pulse 73, temperature 98.1 °F (36.7 °C), temperature source Temporal Artery , resp. rate 18, height 64\" (162.6 cm), weight 130 lb 1.1 oz (59 kg), SpO2 99 %.   No intake or output data in the 24 hours ending 06/01/17 0920       Physical Exam:     General Appearance:    Alert, cooperative, in no acute distress   Head:    Normocephalic, without obvious abnormality, atraumatic   Eyes:            Lids and lashes normal, conjunctivae and sclerae normal, no   icterus, no pallor, corneas clear, PERRLA   Ears:    Ears appear intact with no abnormalities noted   Throat:   No oral lesions, no " thrush, oral mucosa moist   Neck:   No adenopathy, supple, trachea midline, no thyromegaly, no     carotid bruit, no JVD   Back:     No kyphosis present, no scoliosis present, no skin lesions,       erythema or scars, no tenderness to percussion or                   palpation,   range of motion normal   Lungs:     Clear to auscultation,respirations regular, even and                   unlabored    Heart:    Regular rhythm and normal rate, normal S1 and S2, no            murmur, no gallop, no rub, no click   Breast Exam:    Deferred   Abdomen:     Normal bowel sounds, no masses, no organomegaly, soft        non-tender, non-distended, no guarding, no rebound                 tenderness   Genitalia:    Deferred   Extremities:   Moves all extremities well, no edema, no cyanosis, no              redness   Pulses:   Pulses palpable and equal bilaterally   Skin:   No bleeding, bruising or rash   Lymph nodes:   No palpable adenopathy   Neurologic:   Cranial nerves 2 - 12 grossly intact, sensation intact, DTR        present and equal bilaterally        Results Review:     I reviewed the patient's new clinical results.      Results from last 7 days  Lab Units 05/31/17  0952   WBC 10*3/mm3 4.80   HEMOGLOBIN g/dL 14.2   HEMATOCRIT % 43.2   PLATELETS 10*3/mm3 279       Results from last 7 days  Lab Units 05/31/17  0952   SODIUM mmol/L 141   POTASSIUM mmol/L 4.4   CHLORIDE mmol/L 105   TOTAL CO2 mmol/L 32.0*   BUN mg/dL 10   CREATININE mg/dL 0.70   CALCIUM mg/dL 9.9   GLUCOSE mg/dL 78       Results from last 7 days  Lab Units 05/31/17  0952   SODIUM mmol/L 141   POTASSIUM mmol/L 4.4   CHLORIDE mmol/L 105   TOTAL CO2 mmol/L 32.0*   BUN mg/dL 10   CREATININE mg/dL 0.70   GLUCOSE mg/dL 78   CALCIUM mg/dL 9.9       Results from last 7 days  Lab Units 05/31/17  0952   INR  0.92       Tele:  Sinus rhythm     ASSESSMENT:  Hospital Problem List     * (Principal)SVT (supraventricular tachycardia)    Overview Addendum 6/1/2017  9:20 AM by  WILTON Markham     ·  Echo 3/8/2016: Normal LV wall motion and contractility. No valvular heart disease. There is an E to A reversal suggestive of diastolic dysfunction.    Documentation during 1/31/2017 ER visit - terminated with Adenosine  · CCB Added 1/17           PVC (premature ventricular contraction)        PLAN:  EPS and RFA of SVT.  Risk and Benefits explained in detail and patient wants to proceed.     Scribe for WILTON Husain  06/01/17   9:20 AM      IJeremy MD, personally performed the services face to face as described and documented by the above named individual. I have made any necessary edits and it is both accurate and complete 6/1/2017  10:44 AM

## 2017-06-02 VITALS
OXYGEN SATURATION: 98 % | RESPIRATION RATE: 10 BRPM | BODY MASS INDEX: 22.21 KG/M2 | SYSTOLIC BLOOD PRESSURE: 119 MMHG | DIASTOLIC BLOOD PRESSURE: 69 MMHG | WEIGHT: 130.07 LBS | HEIGHT: 64 IN | HEART RATE: 68 BPM | TEMPERATURE: 99.4 F

## 2017-06-02 PROCEDURE — 93010 ELECTROCARDIOGRAM REPORT: CPT | Performed by: INTERNAL MEDICINE

## 2017-06-02 PROCEDURE — 93005 ELECTROCARDIOGRAM TRACING: CPT | Performed by: INTERNAL MEDICINE

## 2017-06-02 PROCEDURE — 99225 PR SBSQ OBSERVATION CARE/DAY 25 MINUTES: CPT | Performed by: INTERNAL MEDICINE

## 2017-06-02 PROCEDURE — G0378 HOSPITAL OBSERVATION PER HR: HCPCS

## 2017-06-02 NOTE — PROGRESS NOTES
"[facility]  Progress Note       LOS: 1 day   Patient Care Team:  Mayra Stapleton MD as PCP - General  Mayra Stapleton MD as PCP - Family Medicine    Chief Complaint:  Palpitations    Subjective   no new complaints.  No chest pain or shortness of breath.  No tachypalpitations overnight.      Interval History: Underwent catheter ablation of SVT        Review of Systems:   Pertinent positives in HPI, all others reviewed and negative.      Objective       Current Facility-Administered Medications:   •  acetaminophen (TYLENOL) tablet 650 mg, 650 mg, Oral, Q4H PRN, Jeremy Oliver MD  •  HYDROcodone-acetaminophen (NORCO) 5-325 MG per tablet 1 tablet, 1 tablet, Oral, Q4H PRN, Jeremy Oliver MD  •  levothyroxine (SYNTHROID, LEVOTHROID) tablet 50 mcg, 50 mcg, Oral, Q AM, Jeremy Oliver MD  •  ondansetron (ZOFRAN) injection 4 mg, 4 mg, Intravenous, Q6H PRN, Jeremy Oliver MD  •  tiZANidine (ZANAFLEX) tablet 2 mg, 2 mg, Oral, TID PRN, Jeremy Oliver MD    Vital Sign Min/Max for last 24 hours  Temp  Min: 99.4 °F (37.4 °C)  Max: 99.4 °F (37.4 °C)   BP  Min: 87/53  Max: 131/94   Pulse  Min: 58  Max: 96   Resp  Min: 10  Max: 12   SpO2  Min: 90 %  Max: 100 %   Flow (L/min)  Min: 2  Max: 2   No Data Recorded     Flowsheet Rows         First Filed Value    Admission Height  64\" (162.6 cm) Documented at 06/01/2017 0840    Admission Weight  130 lb 1.1 oz (59 kg) Documented at 06/01/2017 0840          No intake or output data in the 24 hours ending 06/02/17 0945    Physical Exam:     General Appearance:    Alert, cooperative, in no acute distress   Lungs:     Clear to auscultation,respirations regular, even and                  unlabored    Heart:    Regular rhythm and normal rate, normal S1 and S2, no            murmur, no gallop, no rub, no click   Chest Wall:    No abnormalities observed   Abdomen:     Normal bowel sounds, no masses, no organomegaly, soft        non-tender, " non-distended, no guarding, no rebound                tenderness   Extremities:   Moves all extremities well, no edema, no cyanosis, no             Redness, no hematoma or ecchymosis noted.     Pulses:   Pulses palpable and equal bilaterally   Skin:   No bleeding, bruising or rash        Results Review:     Results from last 7 days  Lab Units 05/31/17 0952   WBC 10*3/mm3 4.80   HEMOGLOBIN g/dL 14.2   HEMATOCRIT % 43.2   PLATELETS 10*3/mm3 279       Results from last 7 days  Lab Units 05/31/17 0952   SODIUM mmol/L 141   POTASSIUM mmol/L 4.4   CHLORIDE mmol/L 105   TOTAL CO2 mmol/L 32.0*   BUN mg/dL 10   CREATININE mg/dL 0.70   GLUCOSE mg/dL 78        Results from last 7 days  Lab Units 05/31/17 0952   HEMOGLOBIN A1C % 5.10                   Results from last 7 days  Lab Units 05/31/17 0952   PROTIME Seconds 10.0   INR  0.92         No intake or output data in the 24 hours ending 06/02/17 0945    I personally viewed and interpreted the patient's EKG/Telemetry data    EKG: Normal sinus rhythm, heart rate of 80 bpm, SD interval 155    Telemetry: Normal sinus rhythm NL SD interval heart rates from 60-96 bpm    Ejection Fraction  No results found for: EF    Echo EF Estimated  No results found for: ECHOEFEST      Present on Admission:  • SVT (supraventricular tachycardia)    Assessment/Plan     1) SVT: Catheter ablation of AVNRT the common type yesterday.  No recurrence overnight.  SD interval normal.  Home later today.  Will stop diltiazem today.  Monitor blood pressure at home.    Plan for disposition: Home today    Jeremy Oliver MD  06/02/17  9:45 AM

## 2017-09-20 ENCOUNTER — OFFICE VISIT (OUTPATIENT)
Dept: CARDIOLOGY | Facility: CLINIC | Age: 62
End: 2017-09-20

## 2017-09-20 VITALS
WEIGHT: 131.6 LBS | HEART RATE: 76 BPM | HEIGHT: 64 IN | SYSTOLIC BLOOD PRESSURE: 113 MMHG | DIASTOLIC BLOOD PRESSURE: 75 MMHG | BODY MASS INDEX: 22.47 KG/M2

## 2017-09-20 DIAGNOSIS — I47.1 SVT (SUPRAVENTRICULAR TACHYCARDIA) (HCC): Primary | ICD-10-CM

## 2017-09-20 DIAGNOSIS — I49.3 PVC (PREMATURE VENTRICULAR CONTRACTION): ICD-10-CM

## 2017-09-20 PROCEDURE — 93000 ELECTROCARDIOGRAM COMPLETE: CPT | Performed by: INTERNAL MEDICINE

## 2017-09-20 PROCEDURE — 99213 OFFICE O/P EST LOW 20 MIN: CPT | Performed by: INTERNAL MEDICINE

## 2017-09-20 NOTE — PROGRESS NOTES
Zainab Kuhn  1955  [phone]  [phone]    09/20/2017    [facility]     Mayra Stapleton MD  [address] [facility] [address] [facility] [address]    Chief Complaint   Patient presents with   • Palpitations       Problem List:    1)SVT    A) Echo 3/8/2016: Normal LV wall motion and contractility. No valvular heart disease. There is an E to A reversal suggestive of diastolic dysfunction.    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 6/1/17    2) Surgical History   Back surgery, parathyroid surgery, Tubal ligation, Sinus surgery         Allergies  Allergies   Allergen Reactions   • Clobetasol Propionate Other (See Comments)     VERTIGO    • Nasonex [Mometasone Furoate] Other (See Comments)     VERTIGO    • Other Other (See Comments)     Steroids- VERTIGO    • Prednisone Other (See Comments)     VERTIGO        Current Medications    Current Outpatient Prescriptions:   •  Fexofenadine HCl (MUCINEX ALLERGY PO), Take 1,200 mg by mouth Daily., Disp: , Rfl:   •  ibuprofen (ADVIL) 200 MG tablet, Take 400 mg by mouth Every 6 (Six) Hours As Needed., Disp: , Rfl:   •  levothyroxine (LEVOXYL) 50 MCG tablet, Take 50 mcg by mouth Daily., Disp: , Rfl:   •  Multiple Vitamins-Minerals (MULTIVITAMIN ADULT PO), Take 1 tablet by mouth Daily., Disp: , Rfl:   •  tiZANidine (ZANAFLEX) 2 MG tablet, Take 1 tablet by mouth 3 (Three) Times a Day As Needed., Disp: , Rfl:     History of Present Illness   HPI    Pt presents for follow up of SVT s/p AVNRT ablation on 6/1/17. Since we last saw the pt, pt denies any SVT episodes, SOB, CP, LH, and dizziness. Denies any hospitalizations, ER visits, or TIA/CVA symptoms. Overall feels well. Occasionally, has PVCs, but very rare.     ROS:  General:  Denies fatigue, weight gain or loss  Cardiovascular:   "Denies CP, PND, syncope, near syncope, edema or palpitations.  Pulmonary:  Denies WYATT, cough, or wheezing      Vitals:    09/20/17 0846   BP: 113/75   BP Location: Right arm   Patient Position: Sitting   Pulse: 76   Weight: 131 lb 9.6 oz (59.7 kg)   Height: 64\" (162.6 cm)     PE:  General: NAD, A & O x 3.   Neck: no JVD, no carotid bruits, no TM  Heart RRR, NL S1, S2, S4 present, no rubs, murmurs  Lungs: CTAB, no wheezes, rhonchi, or rales  Abd: soft, non-tender, NL BS  Ext: No musculoskeletal deformities, no edema, cyanosis, or clubbing  Psych: normal mood and affect    Diagnostic Data:        ECG 12 Lead  Date/Time: 9/20/2017 8:53 AM  Performed by: SARAH OLIVER  Authorized by: SARAH OLIVER   Comparison: compared with previous ECG from 6/2/2017  Similar to previous ECG  Rhythm: sinus rhythm  BPM: 65              1. SVT (supraventricular tachycardia)    2. PVC (premature ventricular contraction)          Plan:  1. AVNRT s/p ablation 3 months ago, no recurrences.  2. PVCs - very rare, not bothersome. Monitor for now.        F/up in 12 months, prn    Scribed for Sarah Oliver MD by Fabby Vega PA-C. 9/20/2017  8:53 AM  ISarah MD, personally performed the services described in this documentation as scribed by the above named individual in my presence, and it is both accurate and complete.  9/20/2017  9:15 AM  "

## 2018-04-25 ENCOUNTER — TRANSCRIBE ORDERS (OUTPATIENT)
Dept: ADMINISTRATIVE | Facility: HOSPITAL | Age: 63
End: 2018-04-25

## 2018-04-25 DIAGNOSIS — Z12.31 VISIT FOR SCREENING MAMMOGRAM: Primary | ICD-10-CM

## 2018-05-25 ENCOUNTER — HOSPITAL ENCOUNTER (OUTPATIENT)
Dept: MAMMOGRAPHY | Facility: HOSPITAL | Age: 63
Discharge: HOME | End: 2018-05-25
Admitting: INTERNAL MEDICINE

## 2018-05-25 DIAGNOSIS — Z12.31 VISIT FOR SCREENING MAMMOGRAM: ICD-10-CM

## 2018-05-25 PROCEDURE — 77063 BREAST TOMOSYNTHESIS BI: CPT | Performed by: RADIOLOGY

## 2018-05-25 PROCEDURE — 77063 BREAST TOMOSYNTHESIS BI: CPT

## 2018-05-25 PROCEDURE — 77067 SCR MAMMO BI INCL CAD: CPT | Performed by: RADIOLOGY

## 2018-05-25 PROCEDURE — 77067 SCR MAMMO BI INCL CAD: CPT

## 2018-06-05 ENCOUNTER — HOSPITAL ENCOUNTER (OUTPATIENT)
Dept: MAMMOGRAPHY | Facility: HOSPITAL | Age: 63
Discharge: HOME | End: 2018-06-05
Admitting: INTERNAL MEDICINE

## 2018-06-05 DIAGNOSIS — R92.8 ABNORMAL MAMMOGRAM: ICD-10-CM

## 2018-06-05 PROCEDURE — G0279 TOMOSYNTHESIS, MAMMO: HCPCS

## 2018-06-05 PROCEDURE — 77065 DX MAMMO INCL CAD UNI: CPT

## 2018-06-05 PROCEDURE — 77061 BREAST TOMOSYNTHESIS UNI: CPT | Performed by: RADIOLOGY

## 2018-06-05 PROCEDURE — 77065 DX MAMMO INCL CAD UNI: CPT | Performed by: RADIOLOGY

## 2018-09-19 ENCOUNTER — APPOINTMENT (OUTPATIENT)
Dept: GENERAL RADIOLOGY | Facility: HOSPITAL | Age: 63
End: 2018-09-19

## 2018-09-19 ENCOUNTER — HOSPITAL ENCOUNTER (EMERGENCY)
Facility: HOSPITAL | Age: 63
Discharge: HOME | End: 2018-09-20
Attending: EMERGENCY MEDICINE | Admitting: EMERGENCY MEDICINE

## 2018-09-19 DIAGNOSIS — Z86.39 HISTORY OF HYPOTHYROIDISM: ICD-10-CM

## 2018-09-19 DIAGNOSIS — I49.3 FREQUENT UNIFOCAL PVCS: ICD-10-CM

## 2018-09-19 DIAGNOSIS — R00.2 HEART PALPITATIONS: Primary | ICD-10-CM

## 2018-09-19 LAB
ALBUMIN SERPL-MCNC: 4.18 G/DL (ref 3.2–4.8)
ALBUMIN/GLOB SERPL: 1.8 G/DL (ref 1.5–2.5)
ALP SERPL-CCNC: 76 U/L (ref 25–100)
ALT SERPL W P-5'-P-CCNC: 13 U/L (ref 7–40)
ANION GAP SERPL CALCULATED.3IONS-SCNC: 5 MMOL/L (ref 3–11)
AST SERPL-CCNC: 24 U/L (ref 0–33)
BASOPHILS # BLD AUTO: 0.04 10*3/MM3 (ref 0–0.2)
BASOPHILS NFR BLD AUTO: 0.9 % (ref 0–1)
BILIRUB SERPL-MCNC: 0.4 MG/DL (ref 0.3–1.2)
BNP SERPL-MCNC: 17 PG/ML (ref 0–100)
BUN BLD-MCNC: 12 MG/DL (ref 9–23)
BUN/CREAT SERPL: 15.2 (ref 7–25)
CALCIUM SPEC-SCNC: 9.1 MG/DL (ref 8.7–10.4)
CHLORIDE SERPL-SCNC: 107 MMOL/L (ref 99–109)
CO2 SERPL-SCNC: 27 MMOL/L (ref 20–31)
CREAT BLD-MCNC: 0.79 MG/DL (ref 0.6–1.3)
DEPRECATED RDW RBC AUTO: 43.5 FL (ref 37–54)
EOSINOPHIL # BLD AUTO: 0.11 10*3/MM3 (ref 0–0.3)
EOSINOPHIL NFR BLD AUTO: 2.4 % (ref 0–3)
ERYTHROCYTE [DISTWIDTH] IN BLOOD BY AUTOMATED COUNT: 13 % (ref 11.3–14.5)
GFR SERPL CREATININE-BSD FRML MDRD: 74 ML/MIN/1.73
GLOBULIN UR ELPH-MCNC: 2.3 GM/DL
GLUCOSE BLD-MCNC: 112 MG/DL (ref 70–100)
HCT VFR BLD AUTO: 37.6 % (ref 34.5–44)
HGB BLD-MCNC: 12.4 G/DL (ref 11.5–15.5)
HOLD SPECIMEN: NORMAL
HOLD SPECIMEN: NORMAL
IMM GRANULOCYTES # BLD: 0 10*3/MM3 (ref 0–0.03)
IMM GRANULOCYTES NFR BLD: 0 % (ref 0–0.6)
LYMPHOCYTES # BLD AUTO: 2.05 10*3/MM3 (ref 0.6–4.8)
LYMPHOCYTES NFR BLD AUTO: 45.4 % (ref 24–44)
MAGNESIUM SERPL-MCNC: 1.9 MG/DL (ref 1.3–2.7)
MCH RBC QN AUTO: 30.1 PG (ref 27–31)
MCHC RBC AUTO-ENTMCNC: 33 G/DL (ref 32–36)
MCV RBC AUTO: 91.3 FL (ref 80–99)
MONOCYTES # BLD AUTO: 0.34 10*3/MM3 (ref 0–1)
MONOCYTES NFR BLD AUTO: 7.5 % (ref 0–12)
NEUTROPHILS # BLD AUTO: 1.98 10*3/MM3 (ref 1.5–8.3)
NEUTROPHILS NFR BLD AUTO: 43.8 % (ref 41–71)
PLATELET # BLD AUTO: 279 10*3/MM3 (ref 150–450)
PMV BLD AUTO: 9.4 FL (ref 6–12)
POTASSIUM BLD-SCNC: 3.6 MMOL/L (ref 3.5–5.5)
PROT SERPL-MCNC: 6.5 G/DL (ref 5.7–8.2)
RBC # BLD AUTO: 4.12 10*6/MM3 (ref 3.89–5.14)
SODIUM BLD-SCNC: 139 MMOL/L (ref 132–146)
TROPONIN I SERPL-MCNC: 0 NG/ML (ref 0–0.07)
TROPONIN I SERPL-MCNC: 0 NG/ML (ref 0–0.07)
TSH SERPL DL<=0.05 MIU/L-ACNC: 2.12 MIU/ML (ref 0.35–5.35)
WBC NRBC COR # BLD: 4.52 10*3/MM3 (ref 3.5–10.8)
WHOLE BLOOD HOLD SPECIMEN: NORMAL
WHOLE BLOOD HOLD SPECIMEN: NORMAL

## 2018-09-19 PROCEDURE — 80053 COMPREHEN METABOLIC PANEL: CPT

## 2018-09-19 PROCEDURE — 84484 ASSAY OF TROPONIN QUANT: CPT

## 2018-09-19 PROCEDURE — 85025 COMPLETE CBC W/AUTO DIFF WBC: CPT

## 2018-09-19 PROCEDURE — 83735 ASSAY OF MAGNESIUM: CPT

## 2018-09-19 PROCEDURE — 93005 ELECTROCARDIOGRAM TRACING: CPT | Performed by: EMERGENCY MEDICINE

## 2018-09-19 PROCEDURE — 83880 ASSAY OF NATRIURETIC PEPTIDE: CPT

## 2018-09-19 PROCEDURE — 71045 X-RAY EXAM CHEST 1 VIEW: CPT

## 2018-09-19 PROCEDURE — 96374 THER/PROPH/DIAG INJ IV PUSH: CPT

## 2018-09-19 PROCEDURE — 84443 ASSAY THYROID STIM HORMONE: CPT

## 2018-09-19 PROCEDURE — 93005 ELECTROCARDIOGRAM TRACING: CPT

## 2018-09-19 PROCEDURE — 99285 EMERGENCY DEPT VISIT HI MDM: CPT

## 2018-09-19 RX ORDER — METOPROLOL TARTRATE 5 MG/5ML
5 INJECTION INTRAVENOUS ONCE
Status: COMPLETED | OUTPATIENT
Start: 2018-09-19 | End: 2018-09-19

## 2018-09-19 RX ORDER — METOPROLOL SUCCINATE 25 MG/1
25 TABLET, EXTENDED RELEASE ORAL DAILY
Qty: 30 TABLET | Refills: 0 | Status: SHIPPED | OUTPATIENT
Start: 2018-09-19 | End: 2018-09-20 | Stop reason: SDUPTHER

## 2018-09-19 RX ORDER — SODIUM CHLORIDE 0.9 % (FLUSH) 0.9 %
10 SYRINGE (ML) INJECTION AS NEEDED
Status: DISCONTINUED | OUTPATIENT
Start: 2018-09-19 | End: 2018-09-20 | Stop reason: HOSPADM

## 2018-09-19 RX ADMIN — METOPROLOL TARTRATE 5 MG: 5 INJECTION, SOLUTION INTRAVENOUS at 23:28

## 2018-09-20 ENCOUNTER — TELEPHONE (OUTPATIENT)
Dept: CARDIOLOGY | Facility: HOSPITAL | Age: 63
End: 2018-09-20

## 2018-09-20 ENCOUNTER — HOSPITAL ENCOUNTER (OUTPATIENT)
Dept: CARDIOLOGY | Facility: HOSPITAL | Age: 63
Discharge: HOME | End: 2018-09-20
Admitting: NURSE PRACTITIONER

## 2018-09-20 ENCOUNTER — OFFICE VISIT (OUTPATIENT)
Dept: CARDIOLOGY | Facility: HOSPITAL | Age: 63
End: 2018-09-20

## 2018-09-20 VITALS
OXYGEN SATURATION: 97 % | WEIGHT: 132 LBS | SYSTOLIC BLOOD PRESSURE: 120 MMHG | BODY MASS INDEX: 22.53 KG/M2 | RESPIRATION RATE: 18 BRPM | HEART RATE: 66 BPM | DIASTOLIC BLOOD PRESSURE: 87 MMHG | HEIGHT: 64 IN | TEMPERATURE: 98.6 F

## 2018-09-20 VITALS
RESPIRATION RATE: 16 BRPM | HEIGHT: 64 IN | OXYGEN SATURATION: 97 % | HEART RATE: 84 BPM | WEIGHT: 132.4 LBS | DIASTOLIC BLOOD PRESSURE: 59 MMHG | BODY MASS INDEX: 22.61 KG/M2 | SYSTOLIC BLOOD PRESSURE: 113 MMHG | TEMPERATURE: 98.3 F

## 2018-09-20 DIAGNOSIS — I47.1 SVT (SUPRAVENTRICULAR TACHYCARDIA) (HCC): ICD-10-CM

## 2018-09-20 DIAGNOSIS — I49.3 PVC (PREMATURE VENTRICULAR CONTRACTION): ICD-10-CM

## 2018-09-20 DIAGNOSIS — R00.2 PALPITATIONS: ICD-10-CM

## 2018-09-20 PROCEDURE — 99214 OFFICE O/P EST MOD 30 MIN: CPT | Performed by: NURSE PRACTITIONER

## 2018-09-20 PROCEDURE — 0296T HC EXT ECG > 48HR TO 21 DAY RCRD W/CONECT INTL RCRD: CPT

## 2018-09-20 RX ORDER — METOPROLOL SUCCINATE 25 MG/1
25 TABLET, EXTENDED RELEASE ORAL DAILY
Qty: 30 TABLET | Refills: 0 | Status: SHIPPED | OUTPATIENT
Start: 2018-09-20 | End: 2018-09-20 | Stop reason: SDUPTHER

## 2018-09-20 RX ORDER — LORATADINE 10 MG/1
10 TABLET ORAL DAILY
COMMUNITY
End: 2022-04-20

## 2018-09-20 RX ORDER — GUAIFENESIN 600 MG/1
1200 TABLET, EXTENDED RELEASE ORAL 2 TIMES DAILY PRN
COMMUNITY

## 2018-09-20 RX ORDER — METOPROLOL SUCCINATE 25 MG/1
25 TABLET, EXTENDED RELEASE ORAL DAILY
Qty: 30 TABLET | Refills: 3 | Status: SHIPPED | OUTPATIENT
Start: 2018-09-20 | End: 2018-10-18 | Stop reason: ALTCHOICE

## 2018-09-20 NOTE — TELEPHONE ENCOUNTER
----- Message from Mirella Rae sent at 9/20/2018 11:41 AM EDT -----  Spoke with Ms. Kuhn and rescheduled the appt to 10-18.    Archana:)  ----- Message -----  From: Blane Pillai APRN  Sent: 9/20/2018  10:46 AM  To: Mirella Rae    Pt scheduled to see Will next week. She was in ED and I saw her today.  I placed 14 day Zio monitor, and ordered echo.  Can we move his f/u out a few weeks in order to get the results (4-6 weeks).

## 2018-09-20 NOTE — ED PROVIDER NOTES
Subjective   Zainab Kuhn is a 63 y.o.female who presents to the ED with palpitations with onset a couple of months ago. She reports that she was at work when suddenly she developed irregular palpitations. She has experienced similar palpitations intermittently over the past couple of months. She notes that one year ago she had a SVT ablation performed. She states that she has an appointment with Dr. Oliver in 6 days. Her palpitations were more consistent today which prompted her visit to the ED. The pt also complains of occasional lightheadedness but denies any chest pain, SoA, or any other complaints at this time. She was recently started on a new manufactured levothyroxine and was concerned that her palpitations were related to the medication change. She denies any beta blocker use.        History provided by:  Patient  Palpitations   Palpitations quality:  Fast  Onset quality:  Sudden  Timing:  Constant  Progression:  Worsening  Chronicity:  New  Relieved by:  None tried  Worsened by:  Nothing  Ineffective treatments:  None tried  Associated symptoms: no chest pain and no shortness of breath        Review of Systems   Respiratory: Negative for shortness of breath.    Cardiovascular: Positive for palpitations. Negative for chest pain.   Neurological: Positive for light-headedness.   All other systems reviewed and are negative.      Past Medical History:   Diagnosis Date   • Back pain    • Constipation    • Disease of thyroid gland    • Limb pain    • Muscle spasm    • Pain, upper back    • PONV (postoperative nausea and vomiting)    • Skin cancer     MELANOMA REMOVED FROM RIGHT THIGH    • SVT (supraventricular tachycardia) (CMS/HCC)    • Wears glasses        Allergies   Allergen Reactions   • Clobetasol Propionate Other (See Comments)     VERTIGO    • Nasonex [Mometasone Furoate] Other (See Comments)     VERTIGO    • Other Other (See Comments)     Steroids- VERTIGO    • Prednisone Other (See Comments)      VERTIGO        Past Surgical History:   Procedure Laterality Date   • BACK SURGERY  2009   • CARDIAC ELECTROPHYSIOLOGY PROCEDURE N/A 6/1/2017    Procedure: Ablation SVT;  Surgeon: Jeremy Oliver MD;  Location: [facility] INVASIVE LOC[facility];  Service:    • COLONOSCOPY  2012   • LUMBAR DISC SURGERY  2009    Status post left L3-L4 discectomy. Minor recurrent disc bulge at L3-L4. Possible discogenic pain component; PER DR MACK    • PARATHYROIDECTOMY  01/2015    Parathyroid Complete Parathyroidectomy   • SINUS SURGERY     • SKIN CANCER EXCISION Right     MELANOMA REMOVED FROM RIGHT THIGH    • THYROID SURGERY  01/2015   • TUBAL ABDOMINAL LIGATION         Family History   Problem Relation Age of Onset   • Hypertension Mother    • Hypotension Father    • Prostate cancer Father    • Breast cancer Neg Hx    • Endometrial cancer Neg Hx    • Ovarian cancer Neg Hx        Social History     Social History   • Marital status:      Social History Main Topics   • Smoking status: Never Smoker   • Smokeless tobacco: Never Used   • Alcohol use 4.2 oz/week     7 Glasses of wine per week      Comment: 4OZ DAILY    • Drug use: No   • Sexual activity: Defer     Other Topics Concern   • Not on file         Objective   Physical Exam   Constitutional: She is oriented to person, place, and time. She appears well-developed and well-nourished. No distress.   HENT:   Head: Normocephalic and atraumatic.   Nose: Nose normal.   Eyes: Conjunctivae are normal. No scleral icterus.   Neck: Normal range of motion. Neck supple.   Cardiovascular: Normal rate, regular rhythm, normal heart sounds and intact distal pulses.    No murmur heard.  Occasional ectopy.    Pulmonary/Chest: Effort normal and breath sounds normal. No respiratory distress.   Abdominal: Soft. Bowel sounds are normal. There is no tenderness.   Neurological: She is alert and oriented to person, place, and time.   Skin: Skin is warm and dry.   Psychiatric: She has a normal mood and  affect. Her behavior is normal.   Nursing note and vitals reviewed.      Procedures         ED Course  ED Course as of Sep 20 0125   Wed Sep 19, 2018   2132 Troponin I: 0.00 [RS]   2258 Troponin I: 0.00 [RS]   2258 Patient has 2 sets of negative cardiac enzymes.  EKG does demonstrate frequent PVCs.  No elevated troponin.  I talked with the patient about the findings with follow-up and return instructions.  She has an appointment scheduled with cardiology in 6 days.  We will arrange to have the patient obtain an event monitor tomorrow.  We will start daily low-dose metoprolol.  I discussed this all with the patient with the risks and benefits.  She voiced understanding and is very happy and agrees with this plan.  [RS]      ED Course User Index  [RS] Jayy Siomns MD     Recent Results (from the past 24 hour(s))   Comprehensive Metabolic Panel    Collection Time: 09/19/18  7:55 PM   Result Value Ref Range    Glucose 112 (H) 70 - 100 mg/dL    BUN 12 9 - 23 mg/dL    Creatinine 0.79 0.60 - 1.30 mg/dL    Sodium 139 132 - 146 mmol/L    Potassium 3.6 3.5 - 5.5 mmol/L    Chloride 107 99 - 109 mmol/L    CO2 27.0 20.0 - 31.0 mmol/L    Calcium 9.1 8.7 - 10.4 mg/dL    Total Protein 6.5 5.7 - 8.2 g/dL    Albumin 4.18 3.20 - 4.80 g/dL    ALT (SGPT) 13 7 - 40 U/L    AST (SGOT) 24 0 - 33 U/L    Alkaline Phosphatase 76 25 - 100 U/L    Total Bilirubin 0.4 0.3 - 1.2 mg/dL    eGFR Non African Amer 74 >60 mL/min/1.73    Globulin 2.3 gm/dL    A/G Ratio 1.8 1.5 - 2.5 g/dL    BUN/Creatinine Ratio 15.2 7.0 - 25.0    Anion Gap 5.0 3.0 - 11.0 mmol/L   Magnesium    Collection Time: 09/19/18  7:55 PM   Result Value Ref Range    Magnesium 1.9 1.3 - 2.7 mg/dL   TSH    Collection Time: 09/19/18  7:55 PM   Result Value Ref Range    TSH 2.125 0.350 - 5.350 mIU/mL   BNP    Collection Time: 09/19/18  7:55 PM   Result Value Ref Range    BNP 17.0 0.0 - 100.0 pg/mL   Light Blue Top    Collection Time: 09/19/18  7:55 PM   Result Value Ref Range     Extra Tube hold for add-on    Green Top (Gel)    Collection Time: 09/19/18  7:55 PM   Result Value Ref Range    Extra Tube Hold for add-ons.    Lavender Top    Collection Time: 09/19/18  7:55 PM   Result Value Ref Range    Extra Tube hold for add-on    Gold Top - SST    Collection Time: 09/19/18  7:55 PM   Result Value Ref Range    Extra Tube Hold for add-ons.    CBC Auto Differential    Collection Time: 09/19/18  7:55 PM   Result Value Ref Range    WBC 4.52 3.50 - 10.80 10*3/mm3    RBC 4.12 3.89 - 5.14 10*6/mm3    Hemoglobin 12.4 11.5 - 15.5 g/dL    Hematocrit 37.6 34.5 - 44.0 %    MCV 91.3 80.0 - 99.0 fL    MCH 30.1 27.0 - 31.0 pg    MCHC 33.0 32.0 - 36.0 g/dL    RDW 13.0 11.3 - 14.5 %    RDW-SD 43.5 37.0 - 54.0 fl    MPV 9.4 6.0 - 12.0 fL    Platelets 279 150 - 450 10*3/mm3    Neutrophil % 43.8 41.0 - 71.0 %    Lymphocyte % 45.4 (H) 24.0 - 44.0 %    Monocyte % 7.5 0.0 - 12.0 %    Eosinophil % 2.4 0.0 - 3.0 %    Basophil % 0.9 0.0 - 1.0 %    Immature Grans % 0.0 0.0 - 0.6 %    Neutrophils, Absolute 1.98 1.50 - 8.30 10*3/mm3    Lymphocytes, Absolute 2.05 0.60 - 4.80 10*3/mm3    Monocytes, Absolute 0.34 0.00 - 1.00 10*3/mm3    Eosinophils, Absolute 0.11 0.00 - 0.30 10*3/mm3    Basophils, Absolute 0.04 0.00 - 0.20 10*3/mm3    Immature Grans, Absolute 0.00 0.00 - 0.03 10*3/mm3   POC Troponin, Rapid    Collection Time: 09/19/18  8:01 PM   Result Value Ref Range    Troponin I 0.00 0.00 - 0.07 ng/mL   POC Troponin, Rapid    Collection Time: 09/19/18 10:37 PM   Result Value Ref Range    Troponin I 0.00 0.00 - 0.07 ng/mL     Note: In addition to lab results from this visit, the labs listed above may include labs taken at another facility or during a different encounter within the last 24 hours. Please correlate lab times with ED admission and discharge times for further clarification of the services performed during this visit.    XR Chest 1 View   Final Result   No acute cardiopulmonary findings.       THIS DOCUMENT HAS  BEEN ELECTRONICALLY SIGNED BY ENID STRICKLAND MD        Vitals:    09/19/18 2300 09/19/18 2350 09/20/18 0000 09/20/18 0001   BP: 127/76 114/73 120/87    Pulse: 65 60  66   Resp:   18    Temp:       SpO2: 97% 97%     Weight:       Height:         Medications   sodium chloride 0.9 % flush 10 mL (not administered)   metoprolol tartrate (LOPRESSOR) injection 5 mg (5 mg Intravenous Given 9/19/18 2328)     ECG/EMG Results (last 24 hours)     Procedure Component Value Units Date/Time    ECG 12 Lead [[id number]] Collected:  09/19/18 1952     Updated:  09/19/18 2119    Narrative:       Test Reason : Dysrhythmia triage protocol  Blood Pressure : **/** mmHG  Vent. Rate : 075 BPM     Atrial Rate : 075 BPM     P-R Int : 150 ms          QRS Dur : 084 ms      QT Int : 398 ms       P-R-T Axes : 071 065 057 degrees     QTc Int : 444 ms    Sinus rhythm with occasional premature ventricular complexes  Otherwise normal ECG  When compared with ECG of 02-JUN-2017 04:19,  premature ventricular complexes are now present  Confirmed by ALLYN SERVIN MD ([id number]) on 9/19/2018 9:19:31 PM    Referred By:  ERMD           Confirmed By:ALLYN SERVIN MD                        [facility]  Number of Diagnoses or Management Options  Frequent unifocal PVCs:   Heart palpitations:   History of hypothyroidism:   Diagnosis management comments: ECG/EMG Results (last 24 hours)     Procedure Component Value Units Date/Time    ECG 12 Lead ([id number]) Collected:  09/19/18 1952     Updated:  09/19/18 2119    Narrative:       Test Reason : Dysrhythmia triage protocol  Blood Pressure : **/** mmHG  Vent. Rate : 075 BPM     Atrial Rate : 075 BPM     P-R Int : 150 ms          QRS Dur : 084 ms      QT Int : 398 ms       P-R-T Axes : 071 065 057 degrees     QTc Int : 444 ms    Sinus rhythm with occasional premature ventricular complexes  Otherwise normal ECG  When compared with ECG of 02-JUN-2017 04:19,  premature ventricular complexes are now present  Confirmed by MALATHI HINOJOSA  ALLYN ([id number]) on 9/19/2018 9:19:31 PM    Referred By:  KOFFI           Confirmed By:ALLYN SIMONS MD    ECG 12 Lead ([id number]) Collected:  09/19/18 2237     Updated:  09/19/18 2239             Amount and/or Complexity of Data Reviewed  Clinical lab tests: reviewed        Final diagnoses:   Heart palpitations   Frequent unifocal PVCs   History of hypothyroidism       Documentation assistance provided by kaleb Zavala.  Information recorded by the scribvernon was done at my direction and has been verified and validated by me.     Julián Zavala  09/19/18 1647       Allyn Simons MD  09/20/18 8327

## 2018-09-20 NOTE — PROGRESS NOTES
[facility]      Encounter Date:09/20/2018     Zainab Kuhn  [address] [address]  [phone]    1955    Mayra Stapleton MD    Zainab Kuhn is a 63 y.o. female.      Subjective:     Chief Complaint:  Palpitations       HPI     63-year-old female presented to the ED with palpitations.  Onset greater than 2 months.  Waxing and waning, intermittent.  Reports having SVT ablation approximately one year ago.  Scheduled to see cardiology in one week.  Associated symptoms lightheadedness.  Denies chest pain, pressure, dyspnea.  Recently started on levothyroxin for newly diagnosed hypothyroidism.  Patient started on low-dose metoprolol.  Patient found to have frequent PVCs on EKG.    Patient Active Problem List    Diagnosis   • PVC (premature ventricular contraction) [I49.3]   • SVT (supraventricular tachycardia) (CMS/HCC) [I47.1]     Overview Note:     ·  Echo 3/8/2016: Normal LV wall motion and contractility. No valvular heart disease. There is an E to A reversal suggestive of diastolic dysfunction.    Documentation during 1/31/2017 ER visit - terminated with Adenosine  · CCB Added 1/17  · AVNRT ablation 6/1/17       • Postlaminectomy syndrome, lumbar [M96.1]   • Spondylolisthesis of lumbosacral region [M43.17]     Overview Note:     Lumbar spondylolisthesis grade 1 at L5-S1, stable       • Unequal leg length [M21.70]   • Sacroiliac joint dysfunction of right side [M53.3]   • Greater trochanteric bursitis of right hip [M70.61]     Overview Note:     right iliotibial band syndrome       • Myofacial muscle pain [M79.1]     Overview Note:     . Right piriformis muscle syndrome     • Lumbar facet arthropathy [M12.88]     Overview Note:      pain and range of motion of the lumbar spine have improved significantly since lumbar medial branch rhizotomies     • Neuroforaminal stenosis of lumbar spine [M99.83]     Overview Note:     Mild left L3-L4 and L4-L5            Past Surgical History:   Procedure Laterality Date   • BACK SURGERY  2009   • CARDIAC ELECTROPHYSIOLOGY PROCEDURE N/A 6/1/2017    Procedure: Ablation SVT;  Surgeon: Jeremy Oliver MD;  Location: [facility] INVASIVE LOCATION;  Service:    • COLONOSCOPY  2012   • LUMBAR DISC SURGERY  2009    Status post left L3-L4 discectomy. Minor recurrent disc bulge at L3-L4. Possible discogenic pain component; PER DR MACK    • PARATHYROIDECTOMY  01/2015    Parathyroid Complete Parathyroidectomy   • SINUS SURGERY     • SKIN CANCER EXCISION Right     MELANOMA REMOVED FROM RIGHT THIGH    • THYROID SURGERY  01/2015   • TUBAL ABDOMINAL LIGATION         Allergies   Allergen Reactions   • Clobetasol Propionate Other (See Comments)     VERTIGO    • Nasonex [Mometasone Furoate] Other (See Comments)     VERTIGO    • Other Other (See Comments)     Steroids- VERTIGO    • Prednisone Other (See Comments)     VERTIGO          Current Outpatient Prescriptions:   •  azelastine (ASTELIN) 0.1 % nasal spray, Instill 1 spray into each nostril 2 (Two) Times a Day. (Patient taking differently: 2 sprays into the nostril(s) as directed by provider Daily.), Disp: 30 mL, Rfl: 11  •  CBD (cannabidiol) oral oil, Take 1 mL by mouth Every Night. For sleep, Disp: , Rfl:   •  guaiFENesin (MUCINEX) 600 MG 12 hr tablet, Take 1,200 mg by mouth 2 (Two) Times a Day As Needed for Cough., Disp: , Rfl:   •  ibuprofen (ADVIL) 200 MG tablet, Take 400 mg by mouth Every 6 (Six) Hours As Needed., Disp: , Rfl:   •  levothyroxine (SYNTHROID, LEVOTHROID) 50 MCG tablet, Take 1 tablet by mouth Daily., Disp: 90 tablet, Rfl: 3  •  loratadine (CLARITIN) 10 MG tablet, Take 10 mg by mouth Daily., Disp: , Rfl:   •  metoprolol succinate XL (TOPROL-XL) 25 MG 24 hr tablet, Take 1 tablet by mouth Daily., Disp: 30 tablet, Rfl: 3  •  Multiple Vitamins-Minerals (MULTIVITAMIN ADULT PO), Take 1 tablet by mouth Daily., Disp: , Rfl:   •  tiZANidine (ZANAFLEX) 2 MG tablet, Take 1 tablet  "by mouth 3 (Three) Times a Day As Needed., Disp: , Rfl:   No current facility-administered medications for this visit.     The following portions of the patient's history were reviewed and updated as appropriate: allergies, current medications, past family history, past medical history, past social history, past surgical history and problem list.    Review of Systems   Cardiovascular: Positive for palpitations.   Neurological: Positive for dizziness.   All other systems reviewed and are negative.      Objective:     Vitals:    09/20/18 0956 09/20/18 1002 09/20/18 1003   BP: 109/63 102/61 113/59   BP Location: Right arm Left arm Left arm   Patient Position: Sitting Sitting Standing   Pulse: 72  84   Resp: 16     Temp: 98.3 °F (36.8 °C)     TempSrc: Temporal Artery      SpO2: 97%     Weight: 60.1 kg (132 lb 6.4 oz)     Height: 162.6 cm (64\")           Physical Exam   Constitutional: She is oriented to person, place, and time. She appears well-developed and well-nourished. No distress.   HENT:   Head: Normocephalic and atraumatic.   Mouth/Throat: Oropharynx is clear and moist.   Eyes: Pupils are equal, round, and reactive to light. Conjunctivae are normal. No scleral icterus.   Neck: No hepatojugular reflux and no JVD present. Carotid bruit is not present. No tracheal deviation present. No thyromegaly present.   Cardiovascular: Normal rate, regular rhythm, normal heart sounds and intact distal pulses.  Exam reveals no friction rub.    No murmur heard.  Pulmonary/Chest: Effort normal and breath sounds normal.   Abdominal: Soft. Bowel sounds are normal. She exhibits no distension. There is no tenderness.   Musculoskeletal: She exhibits no edema.   Lymphadenopathy:     She has no cervical adenopathy.   Neurological: She is alert and oriented to person, place, and time.   Skin: Skin is warm, dry and intact. No rash noted. No cyanosis or erythema. No pallor.   Psychiatric: She has a normal mood and affect. Her behavior is " normal. Thought content normal.   Vitals reviewed.      Lab and Diagnostic Review:  Admission on 09/19/2018, Discharged on 09/20/2018   Component Date Value Ref Range Status   • Glucose 09/19/2018 112* 70 - 100 mg/dL Final   • BUN 09/19/2018 12  9 - 23 mg/dL Final   • Creatinine 09/19/2018 0.79  0.60 - 1.30 mg/dL Final   • Sodium 09/19/2018 139  132 - 146 mmol/L Final   • Potassium 09/19/2018 3.6  3.5 - 5.5 mmol/L Final   • Chloride 09/19/2018 107  99 - 109 mmol/L Final   • CO2 09/19/2018 27.0  20.0 - 31.0 mmol/L Final   • Calcium 09/19/2018 9.1  8.7 - 10.4 mg/dL Final   • Total Protein 09/19/2018 6.5  5.7 - 8.2 g/dL Final   • Albumin 09/19/2018 4.18  3.20 - 4.80 g/dL Final   • ALT (SGPT) 09/19/2018 13  7 - 40 U/L Final   • AST (SGOT) 09/19/2018 24  0 - 33 U/L Final   • Alkaline Phosphatase 09/19/2018 76  25 - 100 U/L Final   • Total Bilirubin 09/19/2018 0.4  0.3 - 1.2 mg/dL Final   • eGFR Non  Amer 09/19/2018 74  >60 mL/min/1.73 Final   • Globulin 09/19/2018 2.3  gm/dL Final   • A/G Ratio 09/19/2018 1.8  1.5 - 2.5 g/dL Final   • BUN/Creatinine Ratio 09/19/2018 15.2  7.0 - 25.0 Final   • Anion Gap 09/19/2018 5.0  3.0 - 11.0 mmol/L Final   • Magnesium 09/19/2018 1.9  1.3 - 2.7 mg/dL Final   • TSH 09/19/2018 2.125  0.350 - 5.350 mIU/mL Final   • BNP 09/19/2018 17.0  0.0 - 100.0 pg/mL Final    Results may be falsely decreased if patient taking Biotin.   • Extra Tube 09/19/2018 hold for add-on   Final    Auto resulted   • Extra Tube 09/19/2018 Hold for add-ons.   Final    Auto resulted.   • Extra Tube 09/19/2018 hold for add-on   Final    Auto resulted   • Extra Tube 09/19/2018 Hold for add-ons.   Final    Auto resulted.   • WBC 09/19/2018 4.52  3.50 - 10.80 10*3/mm3 Final   • RBC 09/19/2018 4.12  3.89 - 5.14 10*6/mm3 Final   • Hemoglobin 09/19/2018 12.4  11.5 - 15.5 g/dL Final   • Hematocrit 09/19/2018 37.6  34.5 - 44.0 % Final   • MCV 09/19/2018 91.3  80.0 - 99.0 fL Final   • MCH 09/19/2018 30.1  27.0 - 31.0 pg  Final   • MCHC 09/19/2018 33.0  32.0 - 36.0 g/dL Final   • RDW 09/19/2018 13.0  11.3 - 14.5 % Final   • RDW-SD 09/19/2018 43.5  37.0 - 54.0 fl Final   • MPV 09/19/2018 9.4  6.0 - 12.0 fL Final   • Platelets 09/19/2018 279  150 - 450 10*3/mm3 Final   • Neutrophil % 09/19/2018 43.8  41.0 - 71.0 % Final   • Lymphocyte % 09/19/2018 45.4* 24.0 - 44.0 % Final   • Monocyte % 09/19/2018 7.5  0.0 - 12.0 % Final   • Eosinophil % 09/19/2018 2.4  0.0 - 3.0 % Final   • Basophil % 09/19/2018 0.9  0.0 - 1.0 % Final   • Immature Grans % 09/19/2018 0.0  0.0 - 0.6 % Final   • Neutrophils, Absolute 09/19/2018 1.98  1.50 - 8.30 10*3/mm3 Final   • Lymphocytes, Absolute 09/19/2018 2.05  0.60 - 4.80 10*3/mm3 Final   • Monocytes, Absolute 09/19/2018 0.34  0.00 - 1.00 10*3/mm3 Final   • Eosinophils, Absolute 09/19/2018 0.11  0.00 - 0.30 10*3/mm3 Final   • Basophils, Absolute 09/19/2018 0.04  0.00 - 0.20 10*3/mm3 Final   • Immature Grans, Absolute 09/19/2018 0.00  0.00 - 0.03 10*3/mm3 Final   • Troponin I 09/19/2018 0.00  0.00 - 0.07 ng/mL Final    Serial Number: [id number]:  [id number]   • Troponin I 09/19/2018 0.00  0.00 - 0.07 ng/mL Final    Serial Number: [id number]:  [id number]       Assessment and Plan:         1. Palpitations    - Adult Transthoracic Echo Complete W/ Cont if Necessary Per Protocol; Future    - Holter Monitor - 72 Hour Up To 21 Days; 14 day Zio    - metoprolol succinate XL (TOPROL-XL) 25 MG 24 hr tablet; Take 1 tablet by mouth Daily.  Dispense: 30 tablet; Refill: 3    2. SVT (supraventricular tachycardia) (CMS/HCC)      3. PVC (premature ventricular contraction)    - Adult Transthoracic Echo Complete W/ Cont if Necessary Per Protocol; Future  - Holter Monitor - 72 Hour Up To 21 Days; Future  - metoprolol succinate XL (TOPROL-XL) 25 MG 24 hr tablet; Take 1 tablet by mouth Daily.  Dispense: 30 tablet; Refill: 3    F/u [facility] as needed or as determined by [facility]    *Please note that portions of this note were  completed with a voice recognition program. Efforts were made to edit the dictations, but occasionally words are mistranscribed.

## 2018-10-04 ENCOUNTER — HOSPITAL ENCOUNTER (OUTPATIENT)
Dept: CARDIOLOGY | Facility: HOSPITAL | Age: 63
Discharge: HOME | End: 2018-10-04
Admitting: NURSE PRACTITIONER

## 2018-10-04 ENCOUNTER — APPOINTMENT (OUTPATIENT)
Dept: CARDIOLOGY | Facility: HOSPITAL | Age: 63
End: 2018-10-04

## 2018-10-04 VITALS — HEIGHT: 64 IN | WEIGHT: 132 LBS | BODY MASS INDEX: 22.53 KG/M2

## 2018-10-04 DIAGNOSIS — R00.2 PALPITATIONS: ICD-10-CM

## 2018-10-04 DIAGNOSIS — I49.3 PVC (PREMATURE VENTRICULAR CONTRACTION): ICD-10-CM

## 2018-10-04 LAB
BH CV ECHO MEAS - AO MAX PG (FULL): 1.3 MMHG
BH CV ECHO MEAS - AO MAX PG: 6 MMHG
BH CV ECHO MEAS - AO MEAN PG (FULL): 1 MMHG
BH CV ECHO MEAS - AO MEAN PG: 3 MMHG
BH CV ECHO MEAS - AO ROOT AREA (BSA CORRECTED): 1.6
BH CV ECHO MEAS - AO ROOT AREA: 5.3 CM^2
BH CV ECHO MEAS - AO ROOT DIAM: 2.6 CM
BH CV ECHO MEAS - AO V2 MAX: 122 CM/SEC
BH CV ECHO MEAS - AO V2 MEAN: 87.6 CM/SEC
BH CV ECHO MEAS - AO V2 VTI: 27.7 CM
BH CV ECHO MEAS - AVA(I,A): 2.6 CM^2
BH CV ECHO MEAS - AVA(I,D): 2.6 CM^2
BH CV ECHO MEAS - AVA(V,A): 2.8 CM^2
BH CV ECHO MEAS - AVA(V,D): 2.8 CM^2
BH CV ECHO MEAS - BSA(HAYCOCK): 1.6 M^2
BH CV ECHO MEAS - BSA: 1.6 M^2
BH CV ECHO MEAS - BZI_BMI: 22.7 KILOGRAMS/M^2
BH CV ECHO MEAS - BZI_METRIC_HEIGHT: 162.6 CM
BH CV ECHO MEAS - BZI_METRIC_WEIGHT: 59.9 KG
BH CV ECHO MEAS - EDV(CUBED): 66.9 ML
BH CV ECHO MEAS - EDV(MOD-SP4): 79 ML
BH CV ECHO MEAS - EDV(TEICH): 72.5 ML
BH CV ECHO MEAS - EF(CUBED): 73.7 %
BH CV ECHO MEAS - EF(MOD-SP4): 63.3 %
BH CV ECHO MEAS - EF(TEICH): 66.1 %
BH CV ECHO MEAS - ESV(CUBED): 17.6 ML
BH CV ECHO MEAS - ESV(MOD-SP4): 29 ML
BH CV ECHO MEAS - ESV(TEICH): 24.6 ML
BH CV ECHO MEAS - FS: 36 %
BH CV ECHO MEAS - IVS/LVPW: 1
BH CV ECHO MEAS - IVSD: 0.9 CM
BH CV ECHO MEAS - LA DIMENSION: 3.1 CM
BH CV ECHO MEAS - LA/AO: 1.2
BH CV ECHO MEAS - LAD MAJOR: 4.4 CM
BH CV ECHO MEAS - LAT PEAK E' VEL: 13 CM/SEC
BH CV ECHO MEAS - LATERAL E/E' RATIO: 5.4
BH CV ECHO MEAS - LV DIASTOLIC VOL/BSA (35-75): 48.2 ML/M^2
BH CV ECHO MEAS - LV MASS(C)D: 106.9 GRAMS
BH CV ECHO MEAS - LV MASS(C)DI: 65.2 GRAMS/M^2
BH CV ECHO MEAS - LV MAX PG: 4.7 MMHG
BH CV ECHO MEAS - LV MEAN PG: 2 MMHG
BH CV ECHO MEAS - LV SYSTOLIC VOL/BSA (12-30): 17.7 ML/M^2
BH CV ECHO MEAS - LV V1 MAX: 108 CM/SEC
BH CV ECHO MEAS - LV V1 MEAN: 65.5 CM/SEC
BH CV ECHO MEAS - LV V1 VTI: 23 CM
BH CV ECHO MEAS - LVIDD: 4.1 CM
BH CV ECHO MEAS - LVIDS: 2.6 CM
BH CV ECHO MEAS - LVLD AP4: 7.2 CM
BH CV ECHO MEAS - LVLS AP4: 5.2 CM
BH CV ECHO MEAS - LVOT AREA (M): 3.1 CM^2
BH CV ECHO MEAS - LVOT AREA: 3.1 CM^2
BH CV ECHO MEAS - LVOT DIAM: 2 CM
BH CV ECHO MEAS - LVPWD: 0.9 CM
BH CV ECHO MEAS - MED PEAK E' VEL: 9.5 CM/SEC
BH CV ECHO MEAS - MEDIAL E/E' RATIO: 7.4
BH CV ECHO MEAS - MV A MAX VEL: 39.5 CM/SEC
BH CV ECHO MEAS - MV E MAX VEL: 70.1 CM/SEC
BH CV ECHO MEAS - MV E/A: 1.8
BH CV ECHO MEAS - PA MAX PG: 2.7 MMHG
BH CV ECHO MEAS - PA V2 MAX: 81.5 CM/SEC
BH CV ECHO MEAS - PI END-D VEL: 98.8 CM/SEC
BH CV ECHO MEAS - RAP SYSTOLE: 3 MMHG
BH CV ECHO MEAS - RVSP: 20 MMHG
BH CV ECHO MEAS - SI(AO): 89.7 ML/M^2
BH CV ECHO MEAS - SI(CUBED): 30.1 ML/M^2
BH CV ECHO MEAS - SI(LVOT): 44.1 ML/M^2
BH CV ECHO MEAS - SI(MOD-SP4): 30.5 ML/M^2
BH CV ECHO MEAS - SI(TEICH): 29.2 ML/M^2
BH CV ECHO MEAS - SV(AO): 147.1 ML
BH CV ECHO MEAS - SV(CUBED): 49.3 ML
BH CV ECHO MEAS - SV(LVOT): 72.3 ML
BH CV ECHO MEAS - SV(MOD-SP4): 50 ML
BH CV ECHO MEAS - SV(TEICH): 47.9 ML
BH CV ECHO MEAS - TAPSE (>1.6): 2.4 CM2
BH CV ECHO MEAS - TR MAX PG: 17 MMHG
BH CV ECHO MEAS - TR MAX VEL: 207.7 CM/SEC
BH CV ECHO MEASUREMENTS AVERAGE E/E' RATIO: 6.23
BH CV VAS BP RIGHT ARM: NORMAL MMHG
BH CV XLRA - RV BASE: 3 CM
BH CV XLRA - RV LENGTH: 5.9 CM
BH CV XLRA - RV MID: 3 CM
BH CV XLRA - TDI S': 15.4 CM/SEC
LEFT ATRIUM VOLUME INDEX: 25.6 ML/M^2
LV EF 2D ECHO EST: 68 %

## 2018-10-04 PROCEDURE — 93306 TTE W/DOPPLER COMPLETE: CPT | Performed by: INTERNAL MEDICINE

## 2018-10-04 PROCEDURE — 93306 TTE W/DOPPLER COMPLETE: CPT

## 2018-10-11 PROCEDURE — 0298T HOLTER MONITOR - 72 HOUR UP TO 21 DAY: CPT | Performed by: INTERNAL MEDICINE

## 2018-10-18 ENCOUNTER — OFFICE VISIT (OUTPATIENT)
Dept: CARDIOLOGY | Facility: CLINIC | Age: 63
End: 2018-10-18

## 2018-10-18 VITALS
WEIGHT: 133.6 LBS | HEART RATE: 59 BPM | DIASTOLIC BLOOD PRESSURE: 78 MMHG | BODY MASS INDEX: 22.81 KG/M2 | SYSTOLIC BLOOD PRESSURE: 134 MMHG | HEIGHT: 64 IN

## 2018-10-18 DIAGNOSIS — R06.02 SOB (SHORTNESS OF BREATH): ICD-10-CM

## 2018-10-18 DIAGNOSIS — I49.3 PVC (PREMATURE VENTRICULAR CONTRACTION): ICD-10-CM

## 2018-10-18 DIAGNOSIS — I47.1 SVT (SUPRAVENTRICULAR TACHYCARDIA) (HCC): Primary | ICD-10-CM

## 2018-10-18 PROCEDURE — 93000 ELECTROCARDIOGRAM COMPLETE: CPT | Performed by: PHYSICIAN ASSISTANT

## 2018-10-18 PROCEDURE — 99214 OFFICE O/P EST MOD 30 MIN: CPT | Performed by: PHYSICIAN ASSISTANT

## 2018-10-18 NOTE — PROGRESS NOTES
"[facility] at [facility]   OFFICE NOTE      Zainab Kuhn  1955  PCP: Mayra Stapleton MD    SUBJECTIVE:   Zainab Kuhn is a 63 y.o. female seen for a follow up visit regarding the following: Palpitations, PVC, SOB, prior SVT    CC:Palpitations    Problem List:     1)SVT    A) Echo 3/8/2016: Normal LV wall motion and contractility. No valvular heart disease. There is an E to A reversal suggestive of diastolic dysfunction.      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  Zio patch with Afib clinic revealing 1.3 % PVC's and brief runs of AT. 10/18    E)No improvement of Toprol     2) Surgical History :Back surgery, parathyroid surgery, Tubal ligation, Sinus surgery       3) HLD      4)Family Hx of CAD       5)GERD symptoms       HPI:   The patient is seen in follow-up regarding history of PVCs, palpitations and prior SVT.  She also has symptoms of fatigue and shortness of breath with exertion.  Mrs. Sanchez presented to [facility] ER after having a day of significant palpitations.  An EKG at the time revealed frequent PVCs.  She subsequent had a follow up with [facility]. fib clinic.  She was started on Toprol therapy with little to no improvement and proceeded with a desire patch.  This I'll revealed less than 1% burden PVCs but runs of atrial tachycardia.  She doesn't describe 2 different types of palpitations one is a brief \"skipped beat\" correlating with PVCs.  She also feels a \"flutter\" that last a few seconds and resolves on its own.  She does when she feels the symptoms To \"catch her breath\".  She does try to stay active and walking and does Pilates and sometimes with these activities she's noted some fatigue shortness of breath but she denies any " chest pain.  She denies any sudden syncope or near-syncope events.  She would like to stop Toprol she feels a lot of her symptoms have become worse since starting this medication.        ROS:  Review of Symptoms:  General: no recent weight loss/gain, + weakness and fatigue  Skin: no rashes, lumps, or other skin changes  HEENT: + dizziness, lightheadedness, no vision changes  Respiratory: no cough or hemoptysis  Cardiovascular:+ palpitations, and tachycardia  Gastrointestinal: no black/tarry stools or diarrhea  Urinary: no change in frequency or urgency  Peripheral Vascular: no claudication or leg cramps  Musculoskeletal: + muscle aches  Psychiatric: no depression or excessive stress  Neurological: no sensory or motor loss, no syncope  Hematologic: no anemia, easy bruising or bleeding  Endocrine: no thyroid problems on synthroid, nor heat or cold intolerance    Cardiac PMH: (Old records have been reviewed and summarized below)      Past Medical History, Past Surgical History, Family history, Social History, and Medications were all reviewed with the patient today and updated as necessary.       Current Outpatient Prescriptions:   •  azelastine (ASTELIN) 0.1 % nasal spray, Instill 1 spray into each nostril 2 (Two) Times a Day. (Patient taking differently: 2 sprays into the nostril(s) as directed by provider Daily.), Disp: 30 mL, Rfl: 11  •  CBD (cannabidiol) oral oil, Take 1 mL by mouth Every Night. For sleep, Disp: , Rfl:   •  guaiFENesin (MUCINEX) 600 MG 12 hr tablet, Take 1,200 mg by mouth 2 (Two) Times a Day As Needed for Cough., Disp: , Rfl:   •  ibuprofen (ADVIL) 200 MG tablet, Take 400 mg by mouth Every 6 (Six) Hours As Needed., Disp: , Rfl:   •  levothyroxine (SYNTHROID, LEVOTHROID) 50 MCG tablet, Take 1 tablet by mouth Daily., Disp: 90 tablet, Rfl: 3  •  loratadine (CLARITIN) 10 MG tablet, Take 10 mg by mouth Daily., Disp: , Rfl:   •  metoprolol succinate XL (TOPROL-XL) 25 MG 24 hr tablet, Take 1 tablet by  mouth Daily., Disp: 30 tablet, Rfl: 3  •  tiZANidine (ZANAFLEX) 2 MG tablet, Take 1 tablet by mouth 3 (Three) Times a Day As Needed., Disp: , Rfl:       Allergies   Allergen Reactions   • Clobetasol Propionate Other (See Comments)     VERTIGO    • Nasonex [Mometasone Furoate] Other (See Comments)     VERTIGO    • Other Other (See Comments)     Steroids- VERTIGO    • Prednisone Other (See Comments)     VERTIGO      Patient Active Problem List   Diagnosis   • Postlaminectomy syndrome, lumbar   • Spondylolisthesis of lumbosacral region   • Unequal leg length   • Sacroiliac joint dysfunction of right side   • Greater trochanteric bursitis of right hip   • Myofacial muscle pain   • Lumbar facet arthropathy   • Neuroforaminal stenosis of lumbar spine   • SVT (supraventricular tachycardia) (CMS/HCC)   • PVC (premature ventricular contraction)     Past Medical History:   Diagnosis Date   • Back pain    • Constipation    • Disease of thyroid gland    • Limb pain    • Muscle spasm    • Pain, upper back    • PONV (postoperative nausea and vomiting)    • Skin cancer     MELANOMA REMOVED FROM RIGHT THIGH    • SVT (supraventricular tachycardia) (CMS/HCC)    • Wears glasses      Past Surgical History:   Procedure Laterality Date   • BACK SURGERY  2009   • CARDIAC ELECTROPHYSIOLOGY PROCEDURE N/A 6/1/2017    Procedure: Ablation SVT;  Surgeon: Jeremy Oliver MD;  Location: [facility] INVASIVE LOCATION;  Service:    • COLONOSCOPY  2012   • LUMBAR DISC SURGERY  2009    Status post left L3-L4 discectomy. Minor recurrent disc bulge at L3-L4. Possible discogenic pain component; PER DR MACK    • PARATHYROIDECTOMY  01/2015    Parathyroid Complete Parathyroidectomy   • SINUS SURGERY     • SKIN CANCER EXCISION Right     MELANOMA REMOVED FROM RIGHT THIGH    • THYROID SURGERY  01/2015   • TUBAL ABDOMINAL LIGATION       Family History   Problem Relation Age of Onset   • Hypertension Mother    • Arrhythmia Mother    • Hypotension Father    •  "Prostate cancer Father    • Heart failure Father    • Hypertension Father    • Breast cancer Neg Hx    • Endometrial cancer Neg Hx    • Ovarian cancer Neg Hx      Social History   Substance Use Topics   • Smoking status: Never Smoker   • Smokeless tobacco: Never Used   • Alcohol use 0.6 - 2.4 oz/week     1 - 4 Glasses of wine per week      Comment: 4OZ 3 times weekly         PHYSICAL EXAM:    /78 (BP Location: Left arm, Patient Position: Sitting)   Pulse 59   Ht 162.6 cm (64\")   Wt 60.6 kg (133 lb 9.6 oz)   LMP  (LMP Unknown)   BMI 22.93 kg/m²        Wt Readings from Last 5 Encounters:   10/18/18 60.6 kg (133 lb 9.6 oz)   10/04/18 59.9 kg (132 lb)   09/20/18 60.1 kg (132 lb 6.4 oz)   09/19/18 59.9 kg (132 lb)   09/20/17 59.7 kg (131 lb 9.6 oz)       BP Readings from Last 5 Encounters:   10/18/18 134/78   09/20/18 113/59   09/20/18 120/87   09/20/17 113/75   06/02/17 119/69       General appearance - Alert, well appearing, and in no distress   Mental status - Affect appropriate to mood.  Eyes - Sclerae anicteric,  ENMT - Hearing grossly normal bilaterally, Dental hygiene good.  Neck - Carotids upstroke normal bilaterally, no bruits, no JVD.  Resp - Clear to auscultation, no wheezes, rales or rhonchi, symmetric air entry.  Heart - Normal rate, regular rhythm, normal S1, S2, no murmurs, rubs, clicks or gallops.  GI - Soft, nontender, nondistended, no masses or organomegaly.  Neurological - Grossly intact - normal speech, no focal findings  Musculoskeletal - No joint tenderness, deformity or swelling, no muscular tenderness noted.  Extremities - Peripheral pulses normal, no pedal edema, no clubbing or cyanosis.  Skin - Normal coloration and turgor.  Psych -  oriented to person, place, and time.    Echocardiorgam 10/18    · Mild tricuspid valve regurgitation is present.  · Calculated right ventricular systolic pressure from tricuspid regurgitation is 20 mmHg.  · Estimated EF = 68%.  · Saline test results are " negative.  · Normal right ventricular cavity size, wall thickness, systolic function and septal motion noted.  · Left ventricular diastolic function is normal.  · No evidence of pulmonary hypertension is present.  · There is no evidence of pericardial effusion.  · No significant structural valvular abnormality demonstrated.              EKG: (EKG has been independently visualized by me and summarized below)    ECG 12 Lead  Date/Time: 10/18/2018 12:34 PM  Performed by: ADELFO SYED  Authorized by: ADELFO SYED   Comparison: compared with previous ECG from 9/19/2018  Similar to previous ECG  Rhythm: sinus rhythm  Rate: normal  Conduction: conduction normal  ST Segments: ST segments normal  T Waves: T waves normal  QRS axis: normal  Other: no other findings  Clinical impression: normal ECG          ASSESSMENT   1. Palpitations:  Zio 10/18 revealing atrial tachycardia, PVC's 1.3% burden.  Toprol XL therapy per Afib clinic, not well tolerated secondary to fatigue and marginal SBP  2. SVT: AVNRT RFA 6/1/17 Dr. Oliver  3. Marginal SBP: Average BP 90's  3. HLD:  Continue diet, exercise  4. Fatigue, sob with exertion. Echo Normal EF , No structural heart disease 10/18.     PLAN  · Stop Toprol XL has not improved symptoms, BP not well tolerated  · GXT MPS to rule out Ischemia  · If Stress negative, consider Tambocor 50 mg BID.   · Follow up with Dr. Oliver 1 month.     10/18/2018  12:32 PM    Will Vivian SERRA

## 2018-10-25 ENCOUNTER — HOSPITAL ENCOUNTER (OUTPATIENT)
Dept: CARDIOLOGY | Facility: HOSPITAL | Age: 63
Discharge: HOME | End: 2018-10-25

## 2018-10-25 ENCOUNTER — APPOINTMENT (OUTPATIENT)
Dept: CARDIOLOGY | Facility: HOSPITAL | Age: 63
End: 2018-10-25

## 2018-10-25 DIAGNOSIS — I47.1 SVT (SUPRAVENTRICULAR TACHYCARDIA) (HCC): ICD-10-CM

## 2018-10-25 DIAGNOSIS — R06.02 SOB (SHORTNESS OF BREATH): ICD-10-CM

## 2018-10-25 DIAGNOSIS — I49.3 PVC (PREMATURE VENTRICULAR CONTRACTION): ICD-10-CM

## 2018-10-25 LAB
BH CV NUCLEAR PRIOR STUDY: 2
BH CV STRESS BP STAGE 1: NORMAL
BH CV STRESS BP STAGE 2: NORMAL
BH CV STRESS BP STAGE 3: NORMAL
BH CV STRESS DURATION MIN STAGE 1: 3
BH CV STRESS DURATION MIN STAGE 2: 3
BH CV STRESS DURATION SEC STAGE 1: 0
BH CV STRESS DURATION SEC STAGE 2: 0
BH CV STRESS DURATION SEC STAGE 3: 45
BH CV STRESS GRADE STAGE 1: 10
BH CV STRESS GRADE STAGE 2: 12
BH CV STRESS GRADE STAGE 3: 14
BH CV STRESS HR STAGE 1: 113
BH CV STRESS HR STAGE 2: 137
BH CV STRESS HR STAGE 3: 146
BH CV STRESS METS STAGE 1: 5
BH CV STRESS METS STAGE 2: 7.5
BH CV STRESS METS STAGE 3: 10
BH CV STRESS PROTOCOL 1: NORMAL
BH CV STRESS RECOVERY BP: NORMAL MMHG
BH CV STRESS RECOVERY HR: 81 BPM
BH CV STRESS SPEED STAGE 1: 1.7
BH CV STRESS SPEED STAGE 2: 2.5
BH CV STRESS SPEED STAGE 3: 3.4
BH CV STRESS STAGE 1: 1
BH CV STRESS STAGE 2: 2
BH CV STRESS STAGE 3: 3
LV EF NUC BP: 73 %
MAXIMAL PREDICTED HEART RATE: 159 BPM
PERCENT MAX PREDICTED HR: 91.82 %
STRESS BASELINE BP: NORMAL MMHG
STRESS BASELINE HR: 66 BPM
STRESS PERCENT HR: 108 %
STRESS POST ESTIMATED WORKLOAD: 9.3 METS
STRESS POST EXERCISE DUR MIN: 6 MIN
STRESS POST EXERCISE DUR SEC: 45 SEC
STRESS POST PEAK BP: NORMAL MMHG
STRESS POST PEAK HR: 146 BPM
STRESS TARGET HR: 135 BPM

## 2018-10-25 PROCEDURE — 93017 CV STRESS TEST TRACING ONLY: CPT

## 2018-10-25 PROCEDURE — 78452 HT MUSCLE IMAGE SPECT MULT: CPT | Performed by: INTERNAL MEDICINE

## 2018-10-25 PROCEDURE — 78452 HT MUSCLE IMAGE SPECT MULT: CPT

## 2018-10-25 PROCEDURE — 93018 CV STRESS TEST I&R ONLY: CPT | Performed by: INTERNAL MEDICINE

## 2018-10-25 PROCEDURE — 0 TECHNETIUM SESTAMIBI: Performed by: PHYSICIAN ASSISTANT

## 2018-10-25 PROCEDURE — A9500 TC99M SESTAMIBI: HCPCS | Performed by: PHYSICIAN ASSISTANT

## 2018-10-25 RX ADMIN — TECHNETIUM TC 99M SESTAMIBI 1 DOSE: 1 INJECTION INTRAVENOUS at 08:05

## 2018-10-25 RX ADMIN — TECHNETIUM TC 99M SESTAMIBI 1 DOSE: 1 INJECTION INTRAVENOUS at 09:50

## 2018-11-02 DIAGNOSIS — I49.3 PVC'S (PREMATURE VENTRICULAR CONTRACTIONS): Primary | ICD-10-CM

## 2018-11-02 RX ORDER — FLECAINIDE ACETATE 50 MG/1
50 TABLET ORAL 2 TIMES DAILY
Qty: 60 TABLET | Refills: 6 | Status: SHIPPED | OUTPATIENT
Start: 2018-11-02 | End: 2018-12-27 | Stop reason: SDUPTHER

## 2018-11-05 ENCOUNTER — CLINICAL SUPPORT (OUTPATIENT)
Dept: CARDIOLOGY | Facility: CLINIC | Age: 63
End: 2018-11-05

## 2018-11-05 DIAGNOSIS — I49.3 PVC (PREMATURE VENTRICULAR CONTRACTION): Primary | ICD-10-CM

## 2018-11-05 DIAGNOSIS — I47.1 SVT (SUPRAVENTRICULAR TACHYCARDIA) (HCC): ICD-10-CM

## 2018-11-05 PROCEDURE — 93000 ELECTROCARDIOGRAM COMPLETE: CPT | Performed by: INTERNAL MEDICINE

## 2018-12-27 ENCOUNTER — OFFICE VISIT (OUTPATIENT)
Dept: CARDIOLOGY | Facility: CLINIC | Age: 63
End: 2018-12-27

## 2018-12-27 VITALS
HEIGHT: 64 IN | WEIGHT: 137 LBS | HEART RATE: 77 BPM | DIASTOLIC BLOOD PRESSURE: 80 MMHG | OXYGEN SATURATION: 98 % | SYSTOLIC BLOOD PRESSURE: 124 MMHG | BODY MASS INDEX: 23.39 KG/M2

## 2018-12-27 DIAGNOSIS — I47.1 SVT (SUPRAVENTRICULAR TACHYCARDIA) (HCC): ICD-10-CM

## 2018-12-27 DIAGNOSIS — I49.3 PVC (PREMATURE VENTRICULAR CONTRACTION): Primary | ICD-10-CM

## 2018-12-27 PROCEDURE — 93000 ELECTROCARDIOGRAM COMPLETE: CPT | Performed by: NURSE PRACTITIONER

## 2018-12-27 PROCEDURE — 99213 OFFICE O/P EST LOW 20 MIN: CPT | Performed by: NURSE PRACTITIONER

## 2018-12-27 RX ORDER — FLECAINIDE ACETATE 50 MG/1
50 TABLET ORAL 2 TIMES DAILY
Qty: 180 TABLET | Refills: 3 | Status: SHIPPED | OUTPATIENT
Start: 2018-12-27 | End: 2020-01-08 | Stop reason: SDUPTHER

## 2018-12-27 NOTE — PROGRESS NOTES
Zainab Kuhn  1955    [phone] (work)      12/27/2018    Location:    Mayra Stapleton MD  [address] [place] [address]    Chief Complaint   Patient presents with   • Rapid Heart Rate        Problem List:   1)SVT    A) Echo 3/8/2016: Normal LV wall motion and contractility. No valvular heart disease. There is an E to A reversal suggestive of diastolic dysfunction.      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  Zio patch with Afib clinic revealing 1.3 % PVC's and brief runs of AT. 10/18     E)No improvement of Toprol  F) Echocardiogram 10/4/18: EF 68%, mild TR  G) GXT stress test 10/25/18: Appropriate heart rate and blood pressure response to exercise, MPS indicates a normal myocardial perfusion study with no evidence of ischemia, EF 70%,coronary artery calcification seen on the CT portion of the exam     2) Surgical History :Back surgery, parathyroid surgery, Tubal ligation, Sinus surgery       3) HLD      4)Family Hx of CAD       5)GERD symptoms        Allergies  Allergies   Allergen Reactions   • Clobetasol Propionate Other (See Comments)     VERTIGO    • Nasonex [Mometasone Furoate] Other (See Comments)     VERTIGO    • Other Other (See Comments)     Steroids- VERTIGO    • Prednisone Other (See Comments)     VERTIGO        Current Medications    Current Outpatient Medications:   •  azelastine (ASTELIN) 0.1 % nasal spray, Instill 1 spray into each nostril 2 (Two) Times a Day. (Patient taking differently: 2 sprays into the nostril(s) as directed by provider Daily.), Disp: 30 mL, Rfl: 11  •  CBD (cannabidiol) oral oil, Take 1 mL by mouth Every Night. For sleep, Disp: , Rfl:   •  flecainide (TAMBOCOR) 50 MG tablet, Take 1 tablet by mouth 2 (Two) Times a Day., Disp: 60  "tablet, Rfl: 6  •  guaiFENesin (MUCINEX) 600 MG 12 hr tablet, Take 1,200 mg by mouth 2 (Two) Times a Day As Needed for Cough., Disp: , Rfl:   •  hepatitis A vaccine (VAQTA) 50 UNIT/ML injection, Inject 1 mL into the appropriate muscle as directed by prescriber. Repeat in 6 months, Disp: 1 mL, Rfl: 1  •  ibuprofen (ADVIL) 200 MG tablet, Take 400 mg by mouth Every 6 (Six) Hours As Needed., Disp: , Rfl:   •  levothyroxine (SYNTHROID, LEVOTHROID) 50 MCG tablet, Take 1 tablet by mouth Daily., Disp: 90 tablet, Rfl: 3  •  loratadine (CLARITIN) 10 MG tablet, Take 10 mg by mouth Daily., Disp: , Rfl:   •  tiZANidine (ZANAFLEX) 2 MG tablet, Take 1 tablet by mouth 3 (Three) Times a Day As Needed., Disp: , Rfl:     History of Present Illness   HPI    Pt presents for follow up of SVT, PVC's.  Since the pt has seen us in clinic last, she underwent stress test which showed no evidence of ischemia and a normal EF.  She was subsequently initiated on flecainide for suppression of her PVCs.  She notes much improvement in her PVC since starting flecainide.  She denies any syncope, SOB, CP, LH, and dizziness. Denies any hospitalizations, ER visits, bleeding, or TIA/CVA symptoms. Overall feels well.    ROS:  General:  Denies fatigue, weight gain or loss  Cardiovascular:  Denies CP, PND, syncope, near syncope, edema or palpitations.  Pulmonary:  Denies WYATT, cough, or wheezing    Vitals:    12/27/18 1100   BP: 124/80   BP Location: Left arm   Patient Position: Sitting   Pulse: 77   SpO2: 98%   Weight: 62.1 kg (137 lb)   Height: 162.6 cm (64\")       PE:  General: NAD  Neck: no JVD, no carotid bruits, no TM  Heart RRR, NL S1, S2, no rubs, murmurs  Lungs: CTA, no wheezes, rhonchi, or rales  Abd: soft, non-tender, NL BS  Ext: No musculoskeletal deformities, no edema, cyanosis, or clubbing  Psych: normal mood and affect    Diagnostic Data:    ECG 12 Lead  Date/Time: 12/27/2018 11:35 AM  Performed by: Rabia Cha APRN  Authorized " by: Rabia Cha APRN   Comparison: compared with previous ECG from 10/18/2018  Similar to previous ECG  Rhythm: sinus rhythm  BPM: 73              1. PVC (premature ventricular contraction)    2. SVT (supraventricular tachycardia) (CMS/HCC)        Plan:  1) PVC's:  - Much improvement in symptoms since starting Flecainide.  QRS acceptable.  Will continue current medications at this time.  2) SVT:   - S/p RFA, no recurrence    F/up in 12 months    JUAN R Garcia  [facility]  12/27/2018  11:39 AM

## 2019-04-22 ENCOUNTER — TRANSCRIBE ORDERS (OUTPATIENT)
Dept: ADMINISTRATIVE | Facility: HOSPITAL | Age: 64
End: 2019-04-22

## 2019-04-22 DIAGNOSIS — Z12.31 VISIT FOR SCREENING MAMMOGRAM: Primary | ICD-10-CM

## 2019-06-17 ENCOUNTER — HOSPITAL ENCOUNTER (OUTPATIENT)
Dept: MAMMOGRAPHY | Facility: HOSPITAL | Age: 64
Discharge: HOME | End: 2019-06-17
Admitting: INTERNAL MEDICINE

## 2019-06-17 DIAGNOSIS — Z12.31 VISIT FOR SCREENING MAMMOGRAM: ICD-10-CM

## 2019-06-17 PROCEDURE — 77067 SCR MAMMO BI INCL CAD: CPT | Performed by: RADIOLOGY

## 2019-06-17 PROCEDURE — 77063 BREAST TOMOSYNTHESIS BI: CPT | Performed by: RADIOLOGY

## 2019-06-17 PROCEDURE — 77063 BREAST TOMOSYNTHESIS BI: CPT

## 2019-06-17 PROCEDURE — 77067 SCR MAMMO BI INCL CAD: CPT

## 2019-10-07 ENCOUNTER — TRANSCRIBE ORDERS (OUTPATIENT)
Dept: ADMINISTRATIVE | Facility: HOSPITAL | Age: 64
End: 2019-10-07

## 2019-10-07 DIAGNOSIS — N95.1 MENOPAUSAL STATE: Primary | ICD-10-CM

## 2020-01-08 ENCOUNTER — OFFICE VISIT (OUTPATIENT)
Dept: CARDIOLOGY | Facility: CLINIC | Age: 65
End: 2020-01-08

## 2020-01-08 VITALS
WEIGHT: 134 LBS | SYSTOLIC BLOOD PRESSURE: 122 MMHG | DIASTOLIC BLOOD PRESSURE: 82 MMHG | HEART RATE: 74 BPM | HEIGHT: 64 IN | BODY MASS INDEX: 22.88 KG/M2

## 2020-01-08 DIAGNOSIS — I47.1 SVT (SUPRAVENTRICULAR TACHYCARDIA) (HCC): Primary | ICD-10-CM

## 2020-01-08 DIAGNOSIS — I49.3 PVC (PREMATURE VENTRICULAR CONTRACTION): ICD-10-CM

## 2020-01-08 PROCEDURE — 93000 ELECTROCARDIOGRAM COMPLETE: CPT | Performed by: PHYSICIAN ASSISTANT

## 2020-01-08 PROCEDURE — 99213 OFFICE O/P EST LOW 20 MIN: CPT | Performed by: PHYSICIAN ASSISTANT

## 2020-01-08 RX ORDER — FLECAINIDE ACETATE 50 MG/1
50 TABLET ORAL 2 TIMES DAILY
Qty: 180 TABLET | Refills: 3 | Status: SHIPPED | OUTPATIENT
Start: 2020-01-08 | End: 2021-01-13 | Stop reason: SDUPTHER

## 2020-01-08 NOTE — PROGRESS NOTES
Zainab Kuhn  1955  [phone]    01/08/2020    [facility] CARDIOLOGY     Mayra Stapleton MD  [address] [id number]    Chief Complaint   Patient presents with   • Rapid Heart Rate     Problem List:    1)SVT    A) Echo 3/8/2016: Normal LV wall motion and contractility. No valvular heart disease. There is an E to A reversal suggestive of diastolic dysfunction.      B) Documentation during 1/31/2017 ER visit - terminated with Adenosine     C) Diagnostic electrophysiology study and radiofrequency ablation of atrioventricular   martina reentry tachycardia of the common type 6/1/17      D)Recurrent Palpitations, [facility] ER visit,  PVC's.  Zio patch with Afib clinic revealing 1.3 % PVC's and brief runs of AT. 10/18     E)No improvement of Toprol  F) Echocardiogram 10/4/18: EF 68%, mild TR  G) GXT stress test 10/25/18: Appropriate heart rate and blood pressure response to exercise, MPS indicates a normal myocardial perfusion study with no evidence of ischemia, EF 70%,coronary artery calcification seen on the CT portion of the exam     2) Surgical History :Back surgery, parathyroid surgery, Tubal ligation, Sinus surgery       3) HLD      4)Family Hx of CAD       5)GERD symptoms      Allergies  Allergies   Allergen Reactions   • Clobetasol Propionate Other (See Comments)     VERTIGO    • Nasonex [Mometasone Furoate] Other (See Comments)     VERTIGO    • Other Other (See Comments)     Steroids- VERTIGO    • Prednisone Other (See Comments)     VERTIGO        Current Medications    Current Outpatient Medications:   •  azelastine (ASTELIN) 0.1 % nasal spray, Instill 1 spray into the nostril(s) as directed by provider 2 (Two) Times a Day. Use in each nostril as directed, Disp: 30 mL, Rfl: 11  •  CBD (cannabidiol) oral oil, Take 1 mL by mouth Every Night. For sleep, Disp: , Rfl:   •  flecainide (TAMBOCOR) 50 MG tablet, Take 1 tablet by mouth 2 (Two) Times a Day., Disp: 180  "tablet, Rfl: 3  •  guaiFENesin (MUCINEX) 600 MG 12 hr tablet, Take 1,200 mg by mouth 2 (Two) Times a Day As Needed for Cough., Disp: , Rfl:   •  hepatitis A vaccine (VAQTA) 50 UNIT/ML injection, Inject 1 mL into the appropriate muscle as directed by prescriber. Repeat in 6 months, Disp: 1 mL, Rfl: 1  •  ibuprofen (ADVIL) 200 MG tablet, Take 400 mg by mouth Every 6 (Six) Hours As Needed., Disp: , Rfl:   •  levothyroxine (SYNTHROID, LEVOTHROID) 50 MCG tablet, Take 1 tablet by mouth Daily., Disp: 90 tablet, Rfl: 3  •  loratadine (CLARITIN) 10 MG tablet, Take 10 mg by mouth Daily., Disp: , Rfl:   •  tiZANidine (ZANAFLEX) 2 MG tablet, Take 1 tablet by mouth Every 8 (Eight) Hours., Disp: 90 tablet, Rfl: 0  •  Zoster Vac Recomb Adjuvanted 50 MCG/0.5ML reconstituted suspension, Inject 0.5 mL into the appropriate muscle as directed by prescriber. Repeat in 2-6 months, Disp: 1 each, Rfl: 1    History of Present Illness     Pt presents for follow up of PVCs and SVT. Since we last saw the pt, she has been doing really well on Flecainide with no SVT and no significant PVCs. She denies, SOB, CP, LH, and syncope. Denies any hospitalizations, ER visits, bleeding, or TIA/CVA symptoms. Overall feels well. No BP issues. She walks regularly and does Pilates for exercise.     ROS:  General:  Denies fatigue, weight gain or loss  Cardiovascular:  Denies CP, PND, syncope, near syncope, edema + rare palpitations.  Pulmonary:  Denies WYATT, cough, or wheezing      Vitals:    01/08/20 1123   BP: 122/82   BP Location: Right arm   Patient Position: Sitting   Pulse: 74   Weight: 60.8 kg (134 lb)   Height: 162.6 cm (64\")     Body mass index is 23 kg/m².  PE:  General: NAD  Neck: no JVD, no carotid bruits, no TM  Heart RRR, NL S1, S2, S4 present, no rubs, murmurs  Lungs: CTA, no wheezes, rhonchi, or rales  Abd: soft, non-tender, NL BS  Ext: No musculoskeletal deformities, no edema, cyanosis, or clubbing  Psych: normal mood and affect    Diagnostic " Data:        ECG 12 Lead  Date/Time: 1/8/2020 11:46 AM  Performed by: Fabby Vega PA  Authorized by: Fabby Vega PA   Comparison: compared with previous ECG from 12/27/2018  Similar to previous ECG  Rhythm: sinus rhythm  BPM: 74              1. SVT (supraventricular tachycardia) (CMS/HCC)    2. PVC (premature ventricular contraction)          Plan:  1) PVC's:  - Doing well on Flecainide.  QRS acceptable.  Will continue current medications at this time.  2) SVT:   - S/p RFA, no recurrence    F/up in 6 months    Electronically signed by WILTON Hedrick, 01/08/20, 11:46 AM.

## 2020-01-13 ENCOUNTER — HOSPITAL ENCOUNTER (OUTPATIENT)
Dept: BONE DENSITY | Facility: HOSPITAL | Age: 65
Discharge: HOME | End: 2020-01-13
Admitting: INTERNAL MEDICINE

## 2020-01-13 DIAGNOSIS — N95.1 MENOPAUSAL STATE: ICD-10-CM

## 2020-01-13 PROCEDURE — 77080 DXA BONE DENSITY AXIAL: CPT

## 2020-05-04 ENCOUNTER — TRANSCRIBE ORDERS (OUTPATIENT)
Dept: ADMINISTRATIVE | Facility: HOSPITAL | Age: 65
End: 2020-05-04

## 2020-05-04 DIAGNOSIS — Z12.31 VISIT FOR SCREENING MAMMOGRAM: Primary | ICD-10-CM

## 2020-06-20 PROCEDURE — 87186 SC STD MICRODIL/AGAR DIL: CPT | Performed by: NURSE PRACTITIONER

## 2020-06-20 PROCEDURE — 87086 URINE CULTURE/COLONY COUNT: CPT | Performed by: NURSE PRACTITIONER

## 2020-06-20 PROCEDURE — 87088 URINE BACTERIA CULTURE: CPT | Performed by: NURSE PRACTITIONER

## 2020-06-20 PROCEDURE — U0003 INFECTIOUS AGENT DETECTION BY NUCLEIC ACID (DNA OR RNA); SEVERE ACUTE RESPIRATORY SYNDROME CORONAVIRUS 2 (SARS-COV-2) (CORONAVIRUS DISEASE [COVID-19]), AMPLIFIED PROBE TECHNIQUE, MAKING USE OF HIGH THROUGHPUT TECHNOLOGIES AS DESCRIBED BY CMS-2020-01-R: HCPCS | Performed by: NURSE PRACTITIONER

## 2020-06-22 ENCOUNTER — TELEPHONE (OUTPATIENT)
Dept: URGENT CARE | Facility: CLINIC | Age: 65
End: 2020-06-22

## 2020-06-22 DIAGNOSIS — N76.0 ACUTE VAGINITIS: Primary | ICD-10-CM

## 2020-06-22 NOTE — TELEPHONE ENCOUNTER
Pt called and given negative covid results. Pt states she is feeling better. No questions at this time.

## 2020-06-22 NOTE — TELEPHONE ENCOUNTER
Pt notified of Urine Culture results. She is taking appropriate antibiotics. States symptoms improving. Also notes some vaginitis, yeast symptoms and requests topical cream RX

## 2020-06-27 ENCOUNTER — TELEPHONE (OUTPATIENT)
Dept: URGENT CARE | Facility: CLINIC | Age: 65
End: 2020-06-27

## 2020-06-27 NOTE — TELEPHONE ENCOUNTER
Patient called clinic complaining of provement urinary symptoms, however she does have some residual pressure.  After talking further with the patient we decided to extend Omnicef out an additional 3 days and have her follow with primary care provider on Monday.  She can return to clinic anytime if worsening or new symptoms develop

## 2020-07-07 ENCOUNTER — HOSPITAL ENCOUNTER (OUTPATIENT)
Dept: MAMMOGRAPHY | Facility: HOSPITAL | Age: 65
Discharge: HOME | End: 2020-07-07
Admitting: INTERNAL MEDICINE

## 2020-07-07 DIAGNOSIS — Z12.31 VISIT FOR SCREENING MAMMOGRAM: ICD-10-CM

## 2020-07-07 PROCEDURE — 77067 SCR MAMMO BI INCL CAD: CPT

## 2020-07-07 PROCEDURE — 77063 BREAST TOMOSYNTHESIS BI: CPT | Performed by: RADIOLOGY

## 2020-07-07 PROCEDURE — 77067 SCR MAMMO BI INCL CAD: CPT | Performed by: RADIOLOGY

## 2020-07-07 PROCEDURE — 77063 BREAST TOMOSYNTHESIS BI: CPT

## 2020-07-13 ENCOUNTER — APPOINTMENT (OUTPATIENT)
Dept: MAMMOGRAPHY | Facility: HOSPITAL | Age: 65
End: 2020-07-13

## 2020-12-21 ENCOUNTER — IMMUNIZATION (OUTPATIENT)
Dept: VACCINE CLINIC | Facility: HOSPITAL | Age: 65
End: 2020-12-21

## 2020-12-21 PROCEDURE — 0001A: CPT | Performed by: INTERNAL MEDICINE

## 2020-12-21 PROCEDURE — 91300 HC SARSCOV02 VAC 30MCG/0.3ML IM: CPT | Performed by: INTERNAL MEDICINE

## 2020-12-22 ENCOUNTER — APPOINTMENT (OUTPATIENT)
Dept: VACCINE CLINIC | Facility: HOSPITAL | Age: 65
End: 2020-12-22

## 2021-01-11 ENCOUNTER — IMMUNIZATION (OUTPATIENT)
Dept: VACCINE CLINIC | Facility: HOSPITAL | Age: 66
End: 2021-01-11

## 2021-01-11 PROCEDURE — 91300 HC SARSCOV02 VAC 30MCG/0.3ML IM: CPT | Performed by: INTERNAL MEDICINE

## 2021-01-11 PROCEDURE — 0002A: CPT | Performed by: INTERNAL MEDICINE

## 2021-01-11 PROCEDURE — 0001A: CPT | Performed by: INTERNAL MEDICINE

## 2021-01-13 ENCOUNTER — OFFICE VISIT (OUTPATIENT)
Dept: CARDIOLOGY | Facility: CLINIC | Age: 66
End: 2021-01-13

## 2021-01-13 VITALS
DIASTOLIC BLOOD PRESSURE: 76 MMHG | HEIGHT: 64 IN | OXYGEN SATURATION: 98 % | WEIGHT: 129 LBS | SYSTOLIC BLOOD PRESSURE: 138 MMHG | HEART RATE: 66 BPM | BODY MASS INDEX: 22.02 KG/M2

## 2021-01-13 DIAGNOSIS — I47.1 SVT (SUPRAVENTRICULAR TACHYCARDIA) (HCC): Primary | ICD-10-CM

## 2021-01-13 DIAGNOSIS — I49.3 PVC (PREMATURE VENTRICULAR CONTRACTION): ICD-10-CM

## 2021-01-13 PROCEDURE — 93000 ELECTROCARDIOGRAM COMPLETE: CPT | Performed by: INTERNAL MEDICINE

## 2021-01-13 PROCEDURE — 99213 OFFICE O/P EST LOW 20 MIN: CPT | Performed by: INTERNAL MEDICINE

## 2021-01-13 RX ORDER — FLECAINIDE ACETATE 50 MG/1
50 TABLET ORAL 2 TIMES DAILY
Qty: 180 TABLET | Refills: 3 | Status: SHIPPED | OUTPATIENT
Start: 2021-01-13 | End: 2022-01-21 | Stop reason: SDUPTHER

## 2021-01-13 NOTE — PROGRESS NOTES
Zainab Kuhn  1955  [phone]    01/13/2021    [facility] CARDIOLOGY     Referring Provider: No ref. provider found     Mayra Stapleton MD  [address] [facility] [id number]    Chief Complaint   Patient presents with   • supraventricular tachycardia       Problem List:    1)SVT    A) Echo 3/8/2016: Normal LV wall motion and contractility. No valvular heart disease. There is an E to A reversal suggestive of diastolic dysfunction.      B) Documentation during 1/31/2017 ER visit - terminated with Adenosine     C) Diagnostic electrophysiology study and radiofrequency ablation of atrioventricular   martina reentry tachycardia of the common type 6/1/17      D)Recurrent Palpitations, [facility] ER visit,  PVC's.  Zio patch with Afib clinic revealing 1.3 % PVC's and brief runs of AT. 10/18     E)No improvement of Toprol  F) Echocardiogram 10/4/18: EF 68%, mild TR  G) GXT stress test 10/25/18: Appropriate heart rate and blood pressure response to exercise, MPS indicates a normal myocardial perfusion study with no evidence of ischemia, EF 70%,coronary artery calcification seen on the CT portion of the exam     2) Surgical History :Back surgery, parathyroid surgery, Tubal ligation, Sinus surgery       3) HLD      4)Family Hx of CAD       5)GERD symptoms           Allergies  Allergies   Allergen Reactions   • Clobetasol Propionate Other (See Comments)     VERTIGO    • Nasonex [Mometasone Furoate] Other (See Comments)     VERTIGO    • Other Other (See Comments)     Steroids- VERTIGO    • Prednisone Other (See Comments)     VERTIGO        Current Medications    Current Outpatient Medications:   •  azelastine (ASTELIN) 0.1 % nasal spray, Spray into nostril(s) as directed by provider 2 (two) times a day., Disp: 30 mL, Rfl: 6  •  azelastine (ASTELIN) 0.1 % nasal spray, Instill 1 spray into the nostril(s) as directed by provider 2 (two) times a day., Disp: 30 mL, Rfl: 6  •  CBD (cannabidiol)  "oral oil, Take 1 mL by mouth Every Night. For sleep, Disp: , Rfl:   •  flecainide (TAMBOCOR) 50 MG tablet, Take 1 tablet by mouth 2 (Two) Times a Day., Disp: 180 tablet, Rfl: 3  •  guaiFENesin (MUCINEX) 600 MG 12 hr tablet, Take 1,200 mg by mouth 2 (Two) Times a Day As Needed for Cough., Disp: , Rfl:   •  hepatitis A vaccine (VAQTA) 50 UNIT/ML injection, Inject 1 mL into the appropriate muscle as directed by prescriber. Repeat in 6 months, Disp: 1 mL, Rfl: 1  •  ibuprofen (ADVIL) 200 MG tablet, Take 400 mg by mouth Every 6 (Six) Hours As Needed., Disp: , Rfl:   •  levothyroxine (SYNTHROID, LEVOTHROID) 50 MCG tablet, Take 1 tablet by mouth Daily., Disp: 90 tablet, Rfl: 3  •  loratadine (CLARITIN) 10 MG tablet, Take 10 mg by mouth Daily., Disp: , Rfl:   •  tiZANidine (ZANAFLEX) 2 MG tablet, Take 1 tablet by mouth Every 8 (Eight) Hours., Disp: 90 tablet, Rfl: 0  •  Zoster Vac Recomb Adjuvanted 50 MCG/0.5ML reconstituted suspension, Inject 0.5 mL into the appropriate muscle as directed by prescriber. Repeat in 2-6 months, Disp: 1 each, Rfl: 1    History of Present Illness     Pt presents for follow up of PVCs and SVT. Since we last saw the pt, she has been doing really well on Flecainide with no SVT and no significant PVCs. She denies, SOB, CP, LH, and syncope. Denies any hospitalizations, ER visits, bleeding, or TIA/CVA symptoms. Overall feels well. No BP issues. She continues to walk regularly, has stopped piliates due to Covid 19 pandemic.     ROS:  General:  Denies fatigue, weight gain or loss  Cardiovascular:  Denies CP, PND, syncope, near syncope, edema or palpitations.  Pulmonary:  Denies WYATT, cough, or wheezing      Vitals:    01/13/21 1141   BP: 138/76   BP Location: Right arm   Patient Position: Sitting   Pulse: 66   SpO2: 98%   Weight: 58.5 kg (129 lb)   Height: 162.6 cm (64\")     Body mass index is 22.14 kg/m².  PE:  General: NAD  Neck: no JVD, no carotid bruits, no TM  Heart RRR, NL S1, S2, no rubs, " murmurs  Lungs: CTA, no wheezes, rhonchi, or rales  Abd: soft, non-tender, NL BS  Ext: No musculoskeletal deformities, no edema, cyanosis, or clubbing  Psych: normal mood and affect    Diagnostic Data:        ECG 12 Lead    Date/Time: 1/13/2021 12:09 PM  Performed by: Jeremy Oliver MD  Authorized by: Jeremy Oliver MD   Comparison: compared with previous ECG from 1/8/2020  Rhythm: sinus rhythm  Rate: normal  BPM: 63              1. SVT (supraventricular tachycardia) (CMS/HCC)    2. PVC (premature ventricular contraction)          Plan:  1) PVC's:  - Doing well on Flecainide. QRS acceptable. Will continue current medications at this time.  2) SVT:   - S/p RFA, no recurrence     F/up in 12 months    Scribed for Jeremy Oliver MD by JUAN R Sandhu. 1/13/2021 12:09 EST     Jeremy BANDA MD, personally performed the services described in this documentation as scribed by the above named individual in my presence, and it is both accurate and complete.  1/13/2021  12:23 EST

## 2021-05-24 ENCOUNTER — TRANSCRIBE ORDERS (OUTPATIENT)
Dept: ADMINISTRATIVE | Facility: HOSPITAL | Age: 66
End: 2021-05-24

## 2021-05-24 DIAGNOSIS — Z12.31 VISIT FOR SCREENING MAMMOGRAM: Primary | ICD-10-CM

## 2021-07-13 ENCOUNTER — HOSPITAL ENCOUNTER (OUTPATIENT)
Dept: MAMMOGRAPHY | Facility: HOSPITAL | Age: 66
Discharge: HOME | End: 2021-07-13
Admitting: INTERNAL MEDICINE

## 2021-07-13 DIAGNOSIS — Z12.31 VISIT FOR SCREENING MAMMOGRAM: ICD-10-CM

## 2021-07-13 PROCEDURE — 77063 BREAST TOMOSYNTHESIS BI: CPT

## 2021-07-13 PROCEDURE — 77063 BREAST TOMOSYNTHESIS BI: CPT | Performed by: RADIOLOGY

## 2021-07-13 PROCEDURE — 77067 SCR MAMMO BI INCL CAD: CPT | Performed by: RADIOLOGY

## 2021-07-13 PROCEDURE — 77067 SCR MAMMO BI INCL CAD: CPT

## 2021-12-24 PROCEDURE — U0004 COV-19 TEST NON-CDC HGH THRU: HCPCS | Performed by: NURSE PRACTITIONER

## 2022-01-21 RX ORDER — FLECAINIDE ACETATE 50 MG/1
50 TABLET ORAL 2 TIMES DAILY
Qty: 180 TABLET | Refills: 3 | Status: SHIPPED | OUTPATIENT
Start: 2022-01-21 | End: 2023-02-02 | Stop reason: SDUPTHER

## 2022-04-20 ENCOUNTER — OFFICE VISIT (OUTPATIENT)
Dept: CARDIOLOGY | Facility: CLINIC | Age: 67
End: 2022-04-20

## 2022-04-20 VITALS
OXYGEN SATURATION: 98 % | HEIGHT: 64 IN | DIASTOLIC BLOOD PRESSURE: 62 MMHG | HEART RATE: 74 BPM | WEIGHT: 130 LBS | SYSTOLIC BLOOD PRESSURE: 118 MMHG | BODY MASS INDEX: 22.2 KG/M2

## 2022-04-20 DIAGNOSIS — I49.3 PVC (PREMATURE VENTRICULAR CONTRACTION): ICD-10-CM

## 2022-04-20 DIAGNOSIS — I47.1 SVT (SUPRAVENTRICULAR TACHYCARDIA): Primary | ICD-10-CM

## 2022-04-20 PROCEDURE — 93000 ELECTROCARDIOGRAM COMPLETE: CPT | Performed by: INTERNAL MEDICINE

## 2022-04-20 PROCEDURE — 99213 OFFICE O/P EST LOW 20 MIN: CPT | Performed by: INTERNAL MEDICINE

## 2022-04-20 NOTE — PROGRESS NOTES
Zainab Kuhn  1955  [phone]    04/20/2022    [facility] CARDIOLOGY     Referring Provider: No ref. provider found     Mayra Stapleton MD  [address] [facility] [address]    Chief Complaint   Patient presents with   • Rapid Heart Rate       Problem List:     1)SVT    A) Echo 3/8/2016: Normal LV wall motion and contractility. No valvular heart disease. There is an E to A reversal suggestive of diastolic dysfunction.      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  Zio patch with Afib clinic revealing 1.3 % PVC's and brief runs of NSAT. 10/18     E) No improvement of Toprol  F) Echocardiogram 10/4/18: EF 68%, mild TR  G) GXT stress test 10/25/18: Appropriate heart rate and blood pressure response to exercise, MPS indicates a normal myocardial perfusion study with no evidence of ischemia, EF 70%,coronary artery calcification seen on the CT portion of the exam     2) Surgical History :Back surgery, parathyroid surgery, Tubal ligation, Sinus surgery       3) HLD      4)Family Hx of CAD       5)GERD symptoms      Allergies  Allergies   Allergen Reactions   • Clobetasol Propionate Other (See Comments)     VERTIGO    • Nasonex [Mometasone Furoate] Other (See Comments)     VERTIGO    • Other Other (See Comments)     Steroids- VERTIGO    • Prednisone Other (See Comments)     VERTIGO        Current Medications    Current Outpatient Medications:   •  CBD (cannabidiol) oral oil, Take 1 mL by mouth Every Night. For sleep, Disp: , Rfl:   •  flecainide (TAMBOCOR) 50 MG tablet, Take 1 tablet by mouth 2 (Two) Times a Day., Disp: 180 tablet, Rfl: 3  •  guaiFENesin (MUCINEX) 600 MG 12 hr tablet, Take 1,200 mg by mouth 2 (Two) Times a Day As Needed for Cough., Disp: , Rfl:  "  •  ibuprofen (ADVIL,MOTRIN) 200 MG tablet, Every 6 (Six) Hours., Disp: , Rfl:   •  levothyroxine (SYNTHROID, LEVOTHROID) 50 MCG tablet, Take 1 tablet by mouth Daily., Disp: 90 tablet, Rfl: 3    History of Present Illness     Pt presents for follow up of SVT/PVC . Since we last saw the pt, pt denies any significant palps, SOB, CP, LH, and dizziness. Denies any hospitalizations, ER visits, bleeding, or TIA/CVA symptoms. Overall feels well. BP stable normal    ROS:  General:  Denies fatigue, weight gain or loss  Cardiovascular:  Denies CP, PND, syncope, near syncope, edema or palpitations.  Pulmonary:  Denies WYATT, cough, or wheezing      Vitals:    04/20/22 1419   BP: 118/62   BP Location: Left arm   Patient Position: Sitting   Pulse: 74   SpO2: 98%   Weight: 59 kg (130 lb)   Height: 162.6 cm (64\")     Body mass index is 22.31 kg/m².  PE:  General: NAD  Neck: no JVD, no carotid bruits, no TM  Heart RRR, NL S1, S2, no rubs, murmurs  Lungs: CTA, no wheezes, rhonchi, or rales  Abd: soft, non-tender, NL BS  Ext: No musculoskeletal deformities, no edema, cyanosis, or clubbing  Psych: normal mood and affect    Diagnostic Data:        ECG 12 Lead    Date/Time: 4/20/2022 2:37 PM  Performed by: Jeremy Oliver MD  Authorized by: Jeremy Oliver MD   Comparison: compared with previous ECG from 1/13/2021  Similar to previous ECG  Rhythm: sinus rhythm  BPM: 64              1. SVT (supraventricular tachycardia) (HCC)    2. PVC (premature ventricular contraction)          Plan:    1) PVC's: well controlled overall   - Doing well on Flecainide. QRS acceptable. No change  2) SVT:   - S/p RFA, no recurrence     F/up in 12 months    F/up in 6 months      "

## 2022-06-03 ENCOUNTER — TRANSCRIBE ORDERS (OUTPATIENT)
Dept: ADMINISTRATIVE | Facility: HOSPITAL | Age: 67
End: 2022-06-03

## 2022-06-03 DIAGNOSIS — Z12.31 VISIT FOR SCREENING MAMMOGRAM: Primary | ICD-10-CM

## 2022-07-18 ENCOUNTER — HOSPITAL ENCOUNTER (OUTPATIENT)
Dept: MAMMOGRAPHY | Facility: HOSPITAL | Age: 67
Discharge: HOME | End: 2022-07-18
Admitting: INTERNAL MEDICINE

## 2022-07-18 DIAGNOSIS — Z12.31 VISIT FOR SCREENING MAMMOGRAM: ICD-10-CM

## 2022-07-18 PROCEDURE — 77063 BREAST TOMOSYNTHESIS BI: CPT | Performed by: RADIOLOGY

## 2022-07-18 PROCEDURE — 77067 SCR MAMMO BI INCL CAD: CPT

## 2022-07-18 PROCEDURE — 77067 SCR MAMMO BI INCL CAD: CPT | Performed by: RADIOLOGY

## 2022-07-18 PROCEDURE — 77063 BREAST TOMOSYNTHESIS BI: CPT

## 2022-10-28 ENCOUNTER — TRANSCRIBE ORDERS (OUTPATIENT)
Dept: ADMINISTRATIVE | Facility: HOSPITAL | Age: 67
End: 2022-10-28

## 2022-10-28 DIAGNOSIS — M85.80 OTHER SPECIFIED DISORDERS OF BONE DENSITY AND STRUCTURE, UNSPECIFIED SITE: Primary | ICD-10-CM

## 2022-11-11 ENCOUNTER — APPOINTMENT (OUTPATIENT)
Dept: BONE DENSITY | Facility: HOSPITAL | Age: 67
End: 2022-11-11

## 2022-11-15 ENCOUNTER — TRANSCRIBE ORDERS (OUTPATIENT)
Dept: ADMINISTRATIVE | Facility: HOSPITAL | Age: 67
End: 2022-11-15

## 2022-11-15 DIAGNOSIS — Z86.006 HISTORY OF MELANOMA IN SITU: ICD-10-CM

## 2022-11-15 DIAGNOSIS — H53.2 DIPLOPIA: Primary | ICD-10-CM

## 2022-11-15 DIAGNOSIS — R51.9 NONINTRACTABLE HEADACHE, UNSPECIFIED CHRONICITY PATTERN, UNSPECIFIED HEADACHE TYPE: ICD-10-CM

## 2022-11-18 ENCOUNTER — TRANSCRIBE ORDERS (OUTPATIENT)
Dept: ADMINISTRATIVE | Facility: HOSPITAL | Age: 67
End: 2022-11-18

## 2022-11-18 DIAGNOSIS — K40.90 RIGHT INGUINAL HERNIA: Primary | ICD-10-CM

## 2022-12-01 ENCOUNTER — HOSPITAL ENCOUNTER (OUTPATIENT)
Dept: CT IMAGING | Facility: HOSPITAL | Age: 67
Discharge: HOME | End: 2022-12-01
Admitting: INTERNAL MEDICINE

## 2022-12-01 DIAGNOSIS — K40.90 RIGHT INGUINAL HERNIA: ICD-10-CM

## 2022-12-01 PROCEDURE — 74176 CT ABD & PELVIS W/O CONTRAST: CPT

## 2022-12-22 ENCOUNTER — HOSPITAL ENCOUNTER (OUTPATIENT)
Dept: MRI IMAGING | Facility: HOSPITAL | Age: 67
Discharge: HOME | End: 2022-12-22
Admitting: PHYSICIAN ASSISTANT

## 2022-12-22 DIAGNOSIS — R51.9 NONINTRACTABLE HEADACHE, UNSPECIFIED CHRONICITY PATTERN, UNSPECIFIED HEADACHE TYPE: ICD-10-CM

## 2022-12-22 DIAGNOSIS — H53.2 DIPLOPIA: ICD-10-CM

## 2022-12-22 DIAGNOSIS — Z86.006 HISTORY OF MELANOMA IN SITU: ICD-10-CM

## 2022-12-22 LAB — CREAT BLDA-MCNC: 0.8 MG/DL (ref 0.6–1.3)

## 2022-12-22 PROCEDURE — 0 GADOBENATE DIMEGLUMINE 529 MG/ML SOLUTION: Performed by: PHYSICIAN ASSISTANT

## 2022-12-22 PROCEDURE — 70553 MRI BRAIN STEM W/O & W/DYE: CPT

## 2022-12-22 PROCEDURE — A9577 INJ MULTIHANCE: HCPCS | Performed by: PHYSICIAN ASSISTANT

## 2022-12-22 PROCEDURE — 82565 ASSAY OF CREATININE: CPT

## 2022-12-22 RX ADMIN — GADOBENATE DIMEGLUMINE 10 ML: 529 INJECTION, SOLUTION INTRAVENOUS at 09:36

## 2023-01-03 ENCOUNTER — PRE-ADMISSION TESTING (OUTPATIENT)
Dept: PREADMISSION TESTING | Facility: HOSPITAL | Age: 68
End: 2023-01-03
Payer: MEDICARE

## 2023-01-03 LAB
ANION GAP SERPL CALCULATED.3IONS-SCNC: 9 MMOL/L (ref 5–15)
BUN SERPL-MCNC: 14 MG/DL (ref 8–23)
BUN/CREAT SERPL: 21.2 (ref 7–25)
CALCIUM SPEC-SCNC: 9.5 MG/DL (ref 8.6–10.5)
CHLORIDE SERPL-SCNC: 104 MMOL/L (ref 98–107)
CO2 SERPL-SCNC: 29 MMOL/L (ref 22–29)
CREAT SERPL-MCNC: 0.66 MG/DL (ref 0.57–1)
DEPRECATED RDW RBC AUTO: 43.9 FL (ref 37–54)
EGFRCR SERPLBLD CKD-EPI 2021: 97.5 ML/MIN/1.73
ERYTHROCYTE [DISTWIDTH] IN BLOOD BY AUTOMATED COUNT: 12.8 % (ref 12.3–15.4)
GLUCOSE SERPL-MCNC: 89 MG/DL (ref 65–99)
HCT VFR BLD AUTO: 39.2 % (ref 34–46.6)
HGB BLD-MCNC: 13.2 G/DL (ref 12–15.9)
MCH RBC QN AUTO: 31.7 PG (ref 26.6–33)
MCHC RBC AUTO-ENTMCNC: 33.7 G/DL (ref 31.5–35.7)
MCV RBC AUTO: 94.2 FL (ref 79–97)
PLATELET # BLD AUTO: 305 10*3/MM3 (ref 140–450)
PMV BLD AUTO: 9.3 FL (ref 6–12)
POTASSIUM SERPL-SCNC: 4.2 MMOL/L (ref 3.5–5.2)
RBC # BLD AUTO: 4.16 10*6/MM3 (ref 3.77–5.28)
SODIUM SERPL-SCNC: 142 MMOL/L (ref 136–145)
WBC NRBC COR # BLD: 4.75 10*3/MM3 (ref 3.4–10.8)

## 2023-01-03 PROCEDURE — 80048 BASIC METABOLIC PNL TOTAL CA: CPT

## 2023-01-03 PROCEDURE — 36415 COLL VENOUS BLD VENIPUNCTURE: CPT

## 2023-01-03 PROCEDURE — 93005 ELECTROCARDIOGRAM TRACING: CPT

## 2023-01-03 PROCEDURE — 93010 ELECTROCARDIOGRAM REPORT: CPT | Performed by: INTERNAL MEDICINE

## 2023-01-03 PROCEDURE — 85027 COMPLETE CBC AUTOMATED: CPT

## 2023-01-03 NOTE — DISCHARGE INSTRUCTIONS
The following information and instructions were given:    Nothing to eat or drink after midnight except sips of water with routine prescribed medication (except blood thinner, certain blood pressure medications, diabetes, or weight reducing medication) unless otherwise instructed by your physician.  Do not eat, drink, smoke or chew gum after midnight the night before surgery. This also includes no mints.    EXCEPTION: ERAS patients Patient instructed to drink 20 ounces (or until full) of Gatorade and it needs to be completed 2 hours before given arrival time on the day of surgery. (NO RED Gatorade)    Patient verbalized understanding.    DO NOT shave for two days before your procedure.  Do not wear makeup.      DO NOT wear fingernail polish (gel/regular) and/or acrylic/artificial nails on the day of surgery.   If a patient had recent manicure and would rather not remove polish or artificial nails, then the minimum requirement is that the polish/artificial nails must be removed from the middle finger on each hand.      If patient was having surgery on an upper extremity, then the patient was instructed that fingernail polish/artificial fingernails must be removed for surgery.  NO EXCEPTIONS.      If patient was having surgery on a lower extremity, then the patient was instructed that toenail polish on both extremities must be removed for surgery.  NO EXCEPTIONS.    Remove all jewelry (advised to go to jeweler if unable to remove).  Jewelry especially rings can no longer be taped for surgery.    Leave anything you consider valuable at home.      Bring the following with you (if applicable)   -picture ID and insurance cards   -Co-pay/deductible required by insurance   -Medications in the original bottles (not a list) including all over-the-counter meds     Patient must have a  for transportation home after procedure.  It must be an   adult that will take responsibility for care for 24 hours after surgery.

## 2023-01-03 NOTE — PAT
An arrival time for procedure was not provided during PAT visit. If patient had any questions or concerns about their arrival time, they were instructed to contact their surgeon/physician.  Additionally, if the patient referred to an arrival time that was acquired from their my chart account, patient was encouraged to verify that time with their surgeon/physician. Arrival times are NOT provided in Pre Admission Testing Department.    PAT chart faxed to [facility].

## 2023-01-04 LAB
QT INTERVAL: 398 MS
QTC INTERVAL: 410 MS

## 2023-02-02 RX ORDER — FLECAINIDE ACETATE 50 MG/1
50 TABLET ORAL 2 TIMES DAILY
Qty: 180 TABLET | Refills: 1 | Status: SHIPPED | OUTPATIENT
Start: 2023-02-02

## 2023-04-21 RX ORDER — SODIUM, POTASSIUM,MAG SULFATES 17.5-3.13G
SOLUTION, RECONSTITUTED, ORAL ORAL
Qty: 354 ML | Refills: 0 | Status: SHIPPED | OUTPATIENT
Start: 2023-04-21

## 2023-04-26 ENCOUNTER — OFFICE VISIT (OUTPATIENT)
Dept: CARDIOLOGY | Facility: CLINIC | Age: 68
End: 2023-04-26
Payer: MEDICARE

## 2023-04-26 VITALS
WEIGHT: 118 LBS | BODY MASS INDEX: 20.14 KG/M2 | OXYGEN SATURATION: 98 % | HEART RATE: 86 BPM | SYSTOLIC BLOOD PRESSURE: 110 MMHG | HEIGHT: 64 IN | DIASTOLIC BLOOD PRESSURE: 70 MMHG

## 2023-04-26 DIAGNOSIS — I49.3 PVC (PREMATURE VENTRICULAR CONTRACTION): Primary | ICD-10-CM

## 2023-04-26 DIAGNOSIS — I47.1 SVT (SUPRAVENTRICULAR TACHYCARDIA): ICD-10-CM

## 2023-04-26 NOTE — PROGRESS NOTES
Zainab Kuhn  1955  [phone]    04/26/2023    [facility] CARDIOLOGY MAIN CAMPUS     Referring Provider: No ref. provider found     Mayra Stapleton MD  [address] [facility] [address]    Chief Complaint   Patient presents with   • supraventricular tachycardia        Problem List:       1)SVT    A) Echo 3/8/2016: Normal LV wall motion and contractility. No valvular heart disease. There is an E to A reversal suggestive of diastolic dysfunction.      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  Zio patch with Afib clinic revealing 1.3 % PVC's and brief runs of NSAT. 10/18     E) No improvement of Toprol  F) Echocardiogram 10/4/18: EF 68%, mild TR  G) GXT stress test 10/25/18: Appropriate heart rate and blood pressure response to exercise, MPS indicates a normal myocardial perfusion study with no evidence of ischemia, EF 70%,coronary artery calcification seen on the CT portion of the exam     2) Surgical History :Back surgery, parathyroid surgery, Tubal ligation, Sinus surgery       3) HLD      4)Family Hx of CAD       5)GERD symptoms      6) Femoral hernia repair         Allergies  Allergies   Allergen Reactions   • Clobetasol Propionate Other (See Comments)     VERTIGO    • Nasonex [Mometasone Furoate] Other (See Comments)     VERTIGO    • Other Other (See Comments)     Steroids- VERTIGO    • Prednisone Other (See Comments)     VERTIGO        Current Medications    Current Outpatient Medications:   •  CBD (cannabidiol) oral oil, Take 1 mL by mouth Every Night. For sleep, Disp: , Rfl:   •  flecainide (TAMBOCOR) 50 MG tablet, Take 1 tablet by mouth 2 (Two) Times a Day., Disp: 180 tablet, Rfl: 1  •  guaiFENesin (MUCINEX) 600 MG 12 hr tablet, Take 2 tablets by  "mouth 2 (Two) Times a Day As Needed for Cough., Disp: , Rfl:   •  ibuprofen (ADVIL,MOTRIN) 200 MG tablet, Every 6 (Six) Hours., Disp: , Rfl:   •  levothyroxine (SYNTHROID, LEVOTHROID) 50 MCG tablet, Take 1 tablet by mouth Daily., Disp: 90 tablet, Rfl: 3    History of Present Illness     Pt presents for follow up of PVCs, SVT s/p ablation in the past. Since we last saw the pt, pt denies any AF episodes, SOB, CP, LH, and dizziness. Denies any hospitalizations, ER visits, bleeding issues, or TIA/CVA symptoms. Overall feels well.     ROS:  General:  Denies fatigue, weight gain or loss  Cardiovascular:  Denies CP, PND, syncope, near syncope, edema or palpitations.  Pulmonary:  Denies WYATT, cough, or wheezing      Vitals:    04/26/23 1145   BP: 110/70   BP Location: Left arm   Patient Position: Sitting   Pulse: 86   SpO2: 98%   Weight: 53.5 kg (118 lb)   Height: 162.6 cm (64\")     Body mass index is 20.25 kg/m².  PE:  General: NAD. A & O x 3   Neck: no JVD, no carotid bruits, no TM  Heart RRR, NL S1, S2, S4 present, no rubs, murmurs  Lungs: CTA, no wheezes, rhonchi, or rales  Abd: soft, non-tender, NL BS  Ext: No musculoskeletal deformities, no edema, cyanosis, or clubbing  Psych: normal mood and affect    Diagnostic Data:        ECG 12 Lead    Date/Time: 4/26/2023 12:11 PM  Performed by: Fabby Vgea PA  Authorized by: Fabby Vega PA   Comparison: compared with previous ECG from 1/3/2023  Similar to previous ECG  Rhythm: sinus rhythm  BPM: 84              1. PVC (premature ventricular contraction)    2. SVT (supraventricular tachycardia)          Plan:  1) PVC's: well controlled overall   - Doing well on Flecainide. QRS acceptable. No change  2) SVT:   - S/p RFA, no recurrence      F/up in 9 months    Electronically signed by WILTON Hedrick, 04/26/23, 12:00 PM EDT.    "

## 2023-04-28 ENCOUNTER — OUTSIDE FACILITY SERVICE (OUTPATIENT)
Dept: GASTROENTEROLOGY | Facility: CLINIC | Age: 68
End: 2023-04-28
Payer: COMMERCIAL

## 2023-04-28 PROCEDURE — 45378 DIAGNOSTIC COLONOSCOPY: CPT | Performed by: INTERNAL MEDICINE

## 2023-06-12 ENCOUNTER — TRANSCRIBE ORDERS (OUTPATIENT)
Dept: ADMINISTRATIVE | Facility: HOSPITAL | Age: 68
End: 2023-06-12
Payer: COMMERCIAL

## 2023-06-12 DIAGNOSIS — Z12.31 SCREENING MAMMOGRAM, ENCOUNTER FOR: Primary | ICD-10-CM

## 2023-07-27 RX ORDER — FLECAINIDE ACETATE 50 MG/1
TABLET ORAL
Qty: 180 TABLET | Refills: 1 | Status: SHIPPED | OUTPATIENT
Start: 2023-07-27

## 2023-11-01 ENCOUNTER — TRANSCRIBE ORDERS (OUTPATIENT)
Dept: ADMINISTRATIVE | Facility: HOSPITAL | Age: 68
End: 2023-11-01
Payer: COMMERCIAL

## 2023-11-01 DIAGNOSIS — M85.80 OTHER SPECIFIED DISORDERS OF BONE DENSITY AND STRUCTURE, UNSPECIFIED SITE: Primary | ICD-10-CM

## 2023-11-13 ENCOUNTER — HOSPITAL ENCOUNTER (OUTPATIENT)
Dept: BONE DENSITY | Facility: HOSPITAL | Age: 68
Discharge: HOME | End: 2023-11-13
Admitting: INTERNAL MEDICINE
Payer: MEDICARE

## 2023-11-13 DIAGNOSIS — M85.80 OTHER SPECIFIED DISORDERS OF BONE DENSITY AND STRUCTURE, UNSPECIFIED SITE: ICD-10-CM

## 2023-11-13 PROCEDURE — 77080 DXA BONE DENSITY AXIAL: CPT

## 2024-01-19 RX ORDER — FLECAINIDE ACETATE 50 MG/1
50 TABLET ORAL 2 TIMES DAILY
Qty: 180 TABLET | Refills: 1 | Status: SHIPPED | OUTPATIENT
Start: 2024-01-19

## 2024-05-08 ENCOUNTER — OFFICE VISIT (OUTPATIENT)
Dept: CARDIOLOGY | Facility: CLINIC | Age: 69
End: 2024-05-08
Payer: MEDICARE

## 2024-05-08 VITALS
HEIGHT: 64 IN | HEART RATE: 68 BPM | OXYGEN SATURATION: 98 % | DIASTOLIC BLOOD PRESSURE: 68 MMHG | SYSTOLIC BLOOD PRESSURE: 116 MMHG | BODY MASS INDEX: 22.43 KG/M2 | WEIGHT: 131.4 LBS

## 2024-05-08 DIAGNOSIS — I47.10 SVT (SUPRAVENTRICULAR TACHYCARDIA): ICD-10-CM

## 2024-05-08 DIAGNOSIS — I49.3 PVC (PREMATURE VENTRICULAR CONTRACTION): Primary | ICD-10-CM

## 2024-05-08 PROCEDURE — 93000 ELECTROCARDIOGRAM COMPLETE: CPT | Performed by: INTERNAL MEDICINE

## 2024-05-08 PROCEDURE — 99213 OFFICE O/P EST LOW 20 MIN: CPT | Performed by: INTERNAL MEDICINE

## 2024-05-08 RX ORDER — FLECAINIDE ACETATE 50 MG/1
50 TABLET ORAL 2 TIMES DAILY
Qty: 180 TABLET | Refills: 3 | Status: SHIPPED | OUTPATIENT
Start: 2024-05-08

## 2024-06-06 ENCOUNTER — TRANSCRIBE ORDERS (OUTPATIENT)
Dept: ADMINISTRATIVE | Facility: HOSPITAL | Age: 69
End: 2024-06-06
Payer: MEDICARE

## 2024-06-06 DIAGNOSIS — N64.4 MASTODYNIA: Primary | ICD-10-CM

## 2024-06-24 ENCOUNTER — HOSPITAL ENCOUNTER (OUTPATIENT)
Dept: ULTRASOUND IMAGING | Facility: HOSPITAL | Age: 69
Discharge: HOME | End: 2024-06-24
Payer: MEDICARE

## 2024-06-24 ENCOUNTER — HOSPITAL ENCOUNTER (OUTPATIENT)
Dept: MAMMOGRAPHY | Facility: HOSPITAL | Age: 69
Discharge: HOME | End: 2024-06-24
Payer: MEDICARE

## 2024-06-24 DIAGNOSIS — N64.4 MASTODYNIA: ICD-10-CM

## 2024-06-24 PROCEDURE — 77066 DX MAMMO INCL CAD BI: CPT

## 2024-06-24 PROCEDURE — G0279 TOMOSYNTHESIS, MAMMO: HCPCS

## 2024-06-24 PROCEDURE — 76642 ULTRASOUND BREAST LIMITED: CPT

## 2024-12-19 ENCOUNTER — TELEPHONE (OUTPATIENT)
Dept: CARDIOLOGY | Facility: CLINIC | Age: 69
End: 2024-12-19
Payer: MEDICARE

## 2024-12-19 NOTE — TELEPHONE ENCOUNTER
Patient would like to know if you would order a calcium scoring test for her or refer her to cardiologist? She said that at her last appointment with you, you mentioned the test to her?

## 2025-02-28 ENCOUNTER — OFFICE VISIT (OUTPATIENT)
Dept: CARDIOLOGY | Facility: CLINIC | Age: 70
End: 2025-02-28
Payer: MEDICARE

## 2025-02-28 VITALS
HEART RATE: 80 BPM | BODY MASS INDEX: 20.14 KG/M2 | DIASTOLIC BLOOD PRESSURE: 88 MMHG | OXYGEN SATURATION: 99 % | SYSTOLIC BLOOD PRESSURE: 124 MMHG | HEIGHT: 64 IN | WEIGHT: 118 LBS

## 2025-02-28 DIAGNOSIS — E78.2 MIXED HYPERLIPIDEMIA: Primary | ICD-10-CM

## 2025-02-28 NOTE — PROGRESS NOTES
[facility] Cardiology  Consultation H&P  Zainab Kuhn  1955  [address] [facility] [address]     VISIT DATE:  02/28/25    PCP: Mayra Stapleton MD  [address] [facility] [address]    IDENTIFICATION: A 69 y.o. female   Sees GFT    PROBLEM LIST:   CAD  2018 GXT stress test : Appropriate heart rate and blood pressure response to exercise, no ischemia, EF 70%, no significant coronary artery calcification noted, low risk  Echocardiogram 10/4/18: EF 68%, mild TR  SVT  Echo 3/8/2016:wnl  1/31/2017 ER visit - trminated with Adenosine  Diagnostic electrophysiology study and radiofrequency ablation of atrioventricular martina reentry tachycardia of the common type 6/1/17   PVCs  Recurrent Palpitations, [facility] ER visit,  PVC's.   Zio patch with Afib clinic revealing 1.3 % PVC's and brief runs of NSAT. 10/18  No improvement of Toprol  Suppressed on Flecainide   HLD  GERD  Surgical history:  Inguinal hernia repair  Lumbar discectomy   Parathyroidectomy   Tubal ligation  Sinus surgery  Melanoma excision      CC:  Chief Complaint   Patient presents with    PVC (premature ventricular contraction)       Allergies  Allergies   Allergen Reactions    Clobetasol Propionate Other (See Comments)     VERTIGO     Nasonex [Mometasone Furoate] Other (See Comments)     VERTIGO     Other Other (See Comments)     Steroids- VERTIGO     Prednisone Other (See Comments)     VERTIGO        Current Medications  Current Outpatient Medications   Medication Instructions    CBD (cannabidiol) oral oil 1 mL, Nightly    flecainide (TAMBOCOR) 50 mg, Oral, 2 Times Daily    guaiFENesin (MUCINEX) 1,200 mg, 2 Times Daily PRN    ibuprofen (ADVIL,MOTRIN) 200 MG tablet Every 6 Hours    levothyroxine (SYNTHROID, LEVOTHROID) 50 mcg, Oral, Daily        History of Present Illness   HPI  Zainab Kuhn is a 69 y.o. year old female with the above mentioned PMH who presents for consult from Jeremy Oliver MD for evaluation of  "CAD.  She has some family history of coronary disease but asymptomatic.  She is recently had lipid profile that reveals small particle LDL but total cholesterol elevated.         ROS  ROS  Benign  SOCIAL HX  Social History     Socioeconomic History    Marital status:    Tobacco Use    Smoking status: Never     Passive exposure: Past    Smokeless tobacco: Never   Vaping Use    Vaping status: Never Used   Substance and Sexual Activity    Alcohol use: Not Currently     Comment: One or two glasses of wine/beer per month    Drug use: No    Sexual activity: Not Currently       FAMILY HX  Family History   Problem Relation Age of Onset    Hypertension Mother     Arrhythmia Mother     Hypotension Father     Prostate cancer Father     Heart failure Father     Hypertension Father     Heart disease Father     Breast cancer Neg Hx     Endometrial cancer Neg Hx     Ovarian cancer Neg Hx        OBJECTIVE:  Vitals:    02/28/25 1007   BP: 124/88   BP Location: Left arm   Patient Position: Sitting   Cuff Size: Adult   Pulse: 80   SpO2: 99%   Weight: 53.5 kg (118 lb)   Height: 162.6 cm (64\")     Body mass index is 20.25 kg/m².     PHYSICAL EXAMINATION:  Constitutional:       Appearance: Healthy appearance. Not in distress.   Neck:      Vascular: No JVR. JVD normal.   Pulmonary:      Effort: Pulmonary effort is normal.      Breath sounds: Normal breath sounds. No wheezing. No rhonchi. No rales.   Chest:      Chest wall: Not tender to palpatation.   Cardiovascular:      PMI at left midclavicular line. Normal rate. Regular rhythm. Normal S1. Normal S2.       Murmurs: There is no murmur.      No gallop.  No click. No rub.   Pulses:     Intact distal pulses.   Edema:     Peripheral edema absent.   Abdominal:      General: Bowel sounds are normal.      Palpations: Abdomen is soft.      Tenderness: There is no abdominal tenderness.   Musculoskeletal: Normal range of motion.         General: No tenderness. Skin:     General: Skin is " warm and dry.   Neurological:      General: No focal deficit present.      Mental Status: Alert and oriented to person, place and time.         Diagnostic Data:  Procedures    LABS:    11/04/2024  CMP: GLU 93, BUN 13, CR 0.74, , K 4.0, CO2 24, ALB 4.2, AST 22, ALT 13  LIPID: [id number]  A1c: 5.2  TSH: 1.730     Advance Care Planning   ACP discussion was held with the patient during this visit. Patient has an advance directive (not in EMR), copy requested.         ASSESSMENT:     Diagnosis Plan   1. Mixed hyperlipidemia            PLAN:      Mixed dyslipidemia will document cardiac calcium score        Jeremy Oliver MD thank you for referring Ms. Kuhn for evaluation.  I have forwarded my electronically generated recommendations to you for review.  Please do not hesitate to call with any questions.      Ki Gray MD, FACC

## 2025-03-13 ENCOUNTER — TELEPHONE (OUTPATIENT)
Dept: CARDIOLOGY | Facility: CLINIC | Age: 70
End: 2025-03-13
Payer: MEDICARE

## 2025-03-13 NOTE — TELEPHONE ENCOUNTER
Rabia Bryant, Renu Gonzalez RN  [facility] pt - CCS 0, no lipid modification needed. Needs follow up with PCP regarding pulm nodule seen on CT. I faxed report to PCP.    Pt called and informed of the above results, verbalized understanding.

## 2025-05-13 ENCOUNTER — TRANSCRIBE ORDERS (OUTPATIENT)
Dept: ADMINISTRATIVE | Facility: HOSPITAL | Age: 70
End: 2025-05-13
Payer: MEDICARE

## 2025-05-13 DIAGNOSIS — Z12.31 ENCOUNTER FOR SCREENING MAMMOGRAM FOR MALIGNANT NEOPLASM OF BREAST: Primary | ICD-10-CM

## 2025-05-21 ENCOUNTER — OFFICE VISIT (OUTPATIENT)
Dept: CARDIOLOGY | Facility: CLINIC | Age: 70
End: 2025-05-21
Payer: MEDICARE

## 2025-05-21 VITALS
HEART RATE: 64 BPM | DIASTOLIC BLOOD PRESSURE: 70 MMHG | OXYGEN SATURATION: 99 % | BODY MASS INDEX: 20.83 KG/M2 | WEIGHT: 122 LBS | HEIGHT: 64 IN | SYSTOLIC BLOOD PRESSURE: 132 MMHG

## 2025-05-21 DIAGNOSIS — I47.10 SVT (SUPRAVENTRICULAR TACHYCARDIA): ICD-10-CM

## 2025-05-21 DIAGNOSIS — I49.3 PVC (PREMATURE VENTRICULAR CONTRACTION): Primary | ICD-10-CM

## 2025-05-21 PROCEDURE — 99214 OFFICE O/P EST MOD 30 MIN: CPT | Performed by: PHYSICIAN ASSISTANT

## 2025-05-21 PROCEDURE — 93000 ELECTROCARDIOGRAM COMPLETE: CPT | Performed by: PHYSICIAN ASSISTANT

## 2025-05-21 RX ORDER — LEVOTHYROXINE SODIUM 75 UG/1
75 TABLET ORAL
COMMUNITY
Start: 2025-05-10

## 2025-05-21 NOTE — PROGRESS NOTES
Zainab Kuhn  1955  [phone]    05/21/2025    [facility] CARDIOLOGY     Referring Provider: No ref. provider found     Mayra Stapleton MD  [address] [facility] [address]    Chief Complaint   Patient presents with    PVC (premature ventricular contraction)       Problem List:        1)SVT    A) Echo 3/8/2016: Normal LV wall motion and contractility. No valvular heart disease. There is an E to A reversal suggestive of diastolic dysfunction.      B) Documentation during 1/31/2017 ER visit - terminated with Adenosine     C) Diagnostic electrophysiology study and radiofrequency ablation of atrioventricular   martina reentry tachycardia of the common type 6/1/17      D)  Recurrent Palpitations, [facility] ER visit,  PVC's.  Zio patch with Afib clinic revealing 1.3 % PVC's and brief runs of NSAT. 10/18     E) No improvement of Toprol  F) Echocardiogram 10/4/18: EF 68%, mild TR  G) GXT stress test 10/25/18: Appropriate heart rate and blood pressure response to exercise, MPS indicates a normal myocardial perfusion study with no evidence of ischemia, EF 70%,coronary artery calcification seen on the CT portion of the exam     2) Surgical History :Back surgery, parathyroid surgery, Tubal ligation, Sinus surgery       3) HLD      4)Family Hx of CAD       5)GERD symptoms      6) Femoral hernia repair     Allergies  Allergies   Allergen Reactions    Clobetasol Propionate Other (See Comments)     VERTIGO     Nasonex [Mometasone Furoate] Other (See Comments)     VERTIGO     Other Other (See Comments)     Steroids- VERTIGO     Prednisone Other (See Comments)     VERTIGO        Current Medications:     Current Outpatient Medications:     flecainide (TAMBOCOR) 50 MG tablet, Take 1 tablet by mouth 2 (Two) Times a Day., Disp: 180 tablet, Rfl: 3    guaiFENesin (MUCINEX) 600 MG 12 hr tablet, Take 2 tablets by mouth 2 (Two) Times a Day As Needed for Cough., Disp: , Rfl:     ibuprofen (ADVIL,MOTRIN) 200 MG  "tablet, Every 6 (Six) Hours., Disp: , Rfl:     levothyroxine (SYNTHROID, LEVOTHROID) 75 MCG tablet, Take 1 tablet by mouth Every Morning., Disp: , Rfl:     History of Present Illness:     Pt presents for follow up of SVT, PVCs. Since we last saw the pt, she is overall doing well. She still takes her Flecainide. She denies any sustained palpitations, SOB, CP, LH, and dizziness. Denies any hospitalizations, ER visits, bleeding, or TIA/CVA symptoms. Overall feels well.        Vitals:    05/21/25 1135   BP: 132/70   BP Location: Right arm   Patient Position: Sitting   Cuff Size: Adult   Pulse: 64   SpO2: 99%   Weight: 55.3 kg (122 lb)   Height: 162.6 cm (64\")     Body mass index is 20.94 kg/m².  PE:  General: NAD. A & O x 3   Neck: no JVD, no carotid bruits, no TM  Heart RRR, NL S1, S2, S4 present, no rubs, murmurs  Lungs: CTA, no wheezes, rhonchi, or rales  Abd: soft, non-tender, NL BS  Ext: No musculoskeletal deformities, no edema, cyanosis, or clubbing  Psych: normal mood and affect    Diagnostic Data:        ECG 12 Lead    Date/Time: 5/21/2025 12:13 PM  Performed by: Fabby Vega PA    Authorized by: Fabby Vega PA  Comparison: compared with previous ECG from 5/8/2024  Similar to previous ECG  Rhythm: sinus rhythm  BPM: 64                 1. PVC (premature ventricular contraction)    2. SVT (supraventricular tachycardia)          Plan:    1) PVC's: well controlled overall   - Doing well on Flecainide. QRS acceptable. No change  2) SVT:   - S/p RFA, no recurrence    F/up in 12 months    Electronically signed by WILTON Hedrick, 05/21/25, 11:59 AM EDT.      "

## 2025-06-20 LAB
NCCN CRITERIA FLAG: NORMAL
TYRER CUZICK SCORE: 3.2

## 2025-06-25 ENCOUNTER — HOSPITAL ENCOUNTER (OUTPATIENT)
Dept: MAMMOGRAPHY | Facility: HOSPITAL | Age: 70
Discharge: HOME | End: 2025-06-25
Admitting: INTERNAL MEDICINE
Payer: MEDICARE

## 2025-06-25 DIAGNOSIS — Z12.31 ENCOUNTER FOR SCREENING MAMMOGRAM FOR MALIGNANT NEOPLASM OF BREAST: ICD-10-CM

## 2025-06-25 PROCEDURE — 77067 SCR MAMMO BI INCL CAD: CPT

## 2025-06-25 PROCEDURE — 77063 BREAST TOMOSYNTHESIS BI: CPT

## 2025-07-18 RX ORDER — FLECAINIDE ACETATE 50 MG/1
50 TABLET ORAL 2 TIMES DAILY
Qty: 180 TABLET | Refills: 3 | Status: SHIPPED | OUTPATIENT
Start: 2025-07-18

## (undated) DEVICE — LEX ELECTRO PHYSIOLOGY: Brand: MEDLINE INDUSTRIES, INC.

## (undated) DEVICE — Device: Brand: MEDEX

## (undated) DEVICE — SET PRIMARY GRVTY 10DP MALE LL 104IN

## (undated) DEVICE — ST EXT IV SMARTSITE 2VLV SP M LL 5ML IV1

## (undated) DEVICE — DRSNG SURESITE WNDW 2.38X2.75

## (undated) DEVICE — ST INF PRI SMRTSTE 20DRP 2VLV 24ML 117

## (undated) DEVICE — DECANT BG O JET

## (undated) DEVICE — CANN NASL CO2 DIVIDED A/

## (undated) DEVICE — Device: Brand: REFERENCE PATCH CARTO 3

## (undated) DEVICE — DRSNG SURESITE123 4X4.8IN

## (undated) DEVICE — INTRO SHEATH ENGAGE W/50 GW .038 7F12

## (undated) DEVICE — Device: Brand: EZ STEER NAV

## (undated) DEVICE — CATH QUAD CRD 6F5MM

## (undated) DEVICE — ADULT, W/LG. BACK PAD, RADIOTRANSPARENT ELEMENT AND LEAD WIRE: Brand: DEFIBRILLATION ELECTRODES

## (undated) DEVICE — Device: Brand: WEBSTER

## (undated) DEVICE — SOL NACL 0.9PCT 1000ML

## (undated) DEVICE — LIMB HOLDERS: Brand: DEROYAL

## (undated) DEVICE — DECANTER: Brand: UNBRANDED